# Patient Record
Sex: MALE | Employment: UNEMPLOYED | URBAN - METROPOLITAN AREA
[De-identification: names, ages, dates, MRNs, and addresses within clinical notes are randomized per-mention and may not be internally consistent; named-entity substitution may affect disease eponyms.]

---

## 2021-05-01 ENCOUNTER — APPOINTMENT (OUTPATIENT)
Dept: GENERAL RADIOLOGY | Age: 31
End: 2021-05-01
Attending: PHYSICIAN ASSISTANT

## 2021-05-01 ENCOUNTER — HOSPITAL ENCOUNTER (EMERGENCY)
Age: 31
Discharge: HOME | End: 2021-05-01
Attending: STUDENT IN AN ORGANIZED HEALTH CARE EDUCATION/TRAINING PROGRAM

## 2021-05-01 VITALS
HEIGHT: 72 IN | OXYGEN SATURATION: 89 % | BODY MASS INDEX: 25.73 KG/M2 | WEIGHT: 190 LBS | TEMPERATURE: 98 F | HEART RATE: 96 BPM | DIASTOLIC BLOOD PRESSURE: 95 MMHG | RESPIRATION RATE: 17 BRPM | SYSTOLIC BLOOD PRESSURE: 140 MMHG

## 2021-05-01 DIAGNOSIS — R03.0 ELEVATED BLOOD PRESSURE READING: ICD-10-CM

## 2021-05-01 DIAGNOSIS — M51.36 DDD (DEGENERATIVE DISC DISEASE), LUMBAR: ICD-10-CM

## 2021-05-01 DIAGNOSIS — M54.41 ACUTE RIGHT-SIDED LOW BACK PAIN WITH RIGHT-SIDED SCIATICA: Primary | ICD-10-CM

## 2021-05-01 PROCEDURE — 72100 X-RAY EXAM L-S SPINE 2/3 VWS: CPT

## 2021-05-01 PROCEDURE — 74011250637 HC RX REV CODE- 250/637: Performed by: PHYSICIAN ASSISTANT

## 2021-05-01 PROCEDURE — 96372 THER/PROPH/DIAG INJ SC/IM: CPT

## 2021-05-01 PROCEDURE — 99283 EMERGENCY DEPT VISIT LOW MDM: CPT

## 2021-05-01 PROCEDURE — 74011250636 HC RX REV CODE- 250/636: Performed by: PHYSICIAN ASSISTANT

## 2021-05-01 RX ORDER — ONDANSETRON 4 MG/1
4 TABLET, ORALLY DISINTEGRATING ORAL
Status: COMPLETED | OUTPATIENT
Start: 2021-05-01 | End: 2021-05-01

## 2021-05-01 RX ORDER — IBUPROFEN 800 MG/1
800 TABLET ORAL
Qty: 20 TAB | Refills: 0 | Status: SHIPPED | OUTPATIENT
Start: 2021-05-01 | End: 2021-05-08

## 2021-05-01 RX ORDER — OXYCODONE HYDROCHLORIDE 5 MG/1
10 TABLET ORAL
Status: COMPLETED | OUTPATIENT
Start: 2021-05-01 | End: 2021-05-01

## 2021-05-01 RX ORDER — KETOROLAC TROMETHAMINE 30 MG/ML
60 INJECTION, SOLUTION INTRAMUSCULAR; INTRAVENOUS
Status: COMPLETED | OUTPATIENT
Start: 2021-05-01 | End: 2021-05-01

## 2021-05-01 RX ORDER — OXYCODONE AND ACETAMINOPHEN 5; 325 MG/1; MG/1
1 TABLET ORAL
Qty: 12 TAB | Refills: 0 | Status: SHIPPED | OUTPATIENT
Start: 2021-05-01 | End: 2021-05-04

## 2021-05-01 RX ADMIN — KETOROLAC TROMETHAMINE 60 MG: 30 INJECTION, SOLUTION INTRAMUSCULAR at 14:58

## 2021-05-01 RX ADMIN — OXYCODONE 10 MG: 5 TABLET ORAL at 14:58

## 2021-05-01 RX ADMIN — ONDANSETRON 4 MG: 4 TABLET, ORALLY DISINTEGRATING ORAL at 14:58

## 2021-05-01 NOTE — ED PROVIDER NOTES
EMERGENCY DEPARTMENT HISTORY AND PHYSICAL EXAM      Date: 5/1/2021  Patient Name: Heather Boudreaux    History of Presenting Illness     Chief Complaint   Patient presents with    Back Pain     Ambulatory into the ED with c/o severa pain in Rt lower back/buttock area radiating down RLE - started about 8 days ago, after bending over to pick something up.  Leg Pain       History Provided By: Patient    HPI: Heather Boudreaux, 32 y.o. male with history of anxiety presents ambulatory to the ED with cc of 8 days of 8 out of 10 constant, aching low back pain that radiates slightly down the right buttock and leg. Symptoms are much worse with transition of position and with standing. Patient tells me 8 days ago he went to bend over and pick something up any heard a \"pop\" and had sharp, sudden low back pain. He did not think much of it and over the next few days was able to perform piriformis stretches, however it seemed to make the problem worse. For the past several days pain has worsened and ultimately he went to an urgent care a couple of days ago and was prescribed prednisone, cyclobenzaprine and tramadol. He has not noticed any significant or lasting improvement with the medications. This is a new problem for him and he denies any previous history of significant back pain. He denies any bowel problems such as anal leakage or constipation. There has been no diarrhea. He denies any urinary symptoms such as urinary retention, dysuria, urgency or frequency. He denies any abdominal pain. There has been no chest pain or shortness of breath. He has been well lately without fever. He endorses normal sense of taste and smell. There has been no recent travel and there are no known sick contacts. There are no other complaints, changes, or physical findings at this time.     PCP: None    Current Outpatient Medications   Medication Sig Dispense Refill    ibuprofen (MOTRIN) 800 mg tablet Take 1 Tab by mouth every eight (8) hours as needed for Pain for up to 7 days. 20 Tab 0    oxyCODONE-acetaminophen (Percocet) 5-325 mg per tablet Take 1 Tab by mouth every six (6) hours as needed for Pain for up to 3 days. Max Daily Amount: 4 Tabs. 12 Tab 0    escitalopram (LEXAPRO) 10 mg tablet Take 10 mg by mouth daily. Indications: MAJOR DEPRESSIVE DISORDER       Past History     Past Medical History:  Past Medical History:   Diagnosis Date    Psychiatric disorder     Anxeity       Past Surgical History:  No past surgical history on file. Family History:  No family history on file. Social History:  Social History     Tobacco Use    Smoking status: Never Smoker    Smokeless tobacco: Never Used   Substance Use Topics    Alcohol use: Yes     Alcohol/week: 22.5 standard drinks     Types: 9 Glasses of wine, 9 Cans of beer, 9 Shots of liquor per week    Drug use: Yes     Types: Marijuana       Allergies: Allergies   Allergen Reactions    Pcn [Penicillins] Nausea and Vomiting     Review of Systems   Review of Systems   Constitutional: Negative for fatigue and fever. HENT: Negative for congestion, ear pain and rhinorrhea. Eyes: Negative for pain and redness. Respiratory: Negative for cough and wheezing. Cardiovascular: Negative for chest pain and palpitations. Gastrointestinal: Negative for abdominal pain, nausea and vomiting. Genitourinary: Negative for dysuria, frequency and urgency. Musculoskeletal: Positive for back pain. Negative for neck pain and neck stiffness. Skin: Negative for rash and wound. Neurological: Negative for weakness, light-headedness, numbness and headaches. Psychiatric/Behavioral: The patient is not nervous/anxious. Physical Exam   Physical Exam  Vitals signs and nursing note reviewed. Constitutional:       General: He is not in acute distress. Appearance: He is well-developed. He is not toxic-appearing. HENT:      Head: Normocephalic and atraumatic.  No right periorbital erythema or left periorbital erythema. Jaw: No trismus. Right Ear: External ear normal.      Left Ear: External ear normal.      Nose: Nose normal.      Mouth/Throat:      Pharynx: Uvula midline. Eyes:      General: No scleral icterus. Conjunctiva/sclera: Conjunctivae normal.      Pupils: Pupils are equal, round, and reactive to light. Neck:      Musculoskeletal: Full passive range of motion without pain and normal range of motion. Cardiovascular:      Rate and Rhythm: Normal rate and regular rhythm. Heart sounds: Normal heart sounds. Pulmonary:      Effort: Pulmonary effort is normal. No tachypnea, accessory muscle usage or respiratory distress. Breath sounds: Normal breath sounds. No decreased breath sounds or wheezing. Abdominal:      Palpations: Abdomen is soft. Abdomen is not rigid. Tenderness: There is no abdominal tenderness. There is no guarding. Musculoskeletal: Normal range of motion. Lumbar back: He exhibits tenderness. Back:       Comments:   LOWER BACK:  No bruising, redness or swelling. No step off. Diffuse discomfort that radiates down the right buttock to leg. No CVA tenderness   Skin:     Findings: No rash. Neurological:      Mental Status: He is alert and oriented to person, place, and time. He is not disoriented. GCS: GCS eye subscore is 4. GCS verbal subscore is 5. GCS motor subscore is 6. Cranial Nerves: No cranial nerve deficit. Sensory: No sensory deficit. Comments:   POS seated SLR on the right  Symmetric bulk and tone of both LE  Normal sensation along all LE dermatomes  Ambulates independently with antalgic gait   Psychiatric:         Mood and Affect: Mood is not anxious. Speech: Speech normal.         Thought Content: Thought content does not include suicidal ideation. Diagnostic Study Results     Labs -   No results found for this or any previous visit (from the past 12 hour(s)).     Radiologic Studies -   XR SPINE LUMB 2 OR 3 V   Final Result   Mild degenerative changes at L5-S1. Moderate stool burden in the   colon. CT Results  (Last 48 hours)    None        CXR Results  (Last 48 hours)    None        Medical Decision Making   I am the first provider for this patient. I reviewed the vital signs, available nursing notes, past medical history, past surgical history, family history and social history. Vital Signs-Reviewed the patient's vital signs. Patient Vitals for the past 12 hrs:   Temp Pulse Resp BP SpO2   05/01/21 1600    (!) 140/95 (!) 89 %   05/01/21 1515     96 %   05/01/21 1445     94 %   05/01/21 1401 98 °F (36.7 °C) 96 17 (!) 148/117 97 %       Pulse Oximetry Analysis - 97% on RA    Records Reviewed: Nursing Notes, Old Medical Records and Previous Laboratory Studies    Provider Notes (Medical Decision Making):   DDx: Compression fracture, HNP, DDD, strain    ED Course:   Initial assessment performed. The patients presenting problems have been discussed, and they are in agreement with the care plan formulated and outlined with them. I have encouraged them to ask questions as they arise throughout their visit. Disposition:  Discharge    PLAN:  1. Discharge Medication List as of 5/1/2021  3:32 PM      START taking these medications    Details   ibuprofen (MOTRIN) 800 mg tablet Take 1 Tab by mouth every eight (8) hours as needed for Pain for up to 7 days. , Normal, Disp-20 Tab, R-0      oxyCODONE-acetaminophen (Percocet) 5-325 mg per tablet Take 1 Tab by mouth every six (6) hours as needed for Pain for up to 3 days. Max Daily Amount: 4 Tabs., Normal, Disp-12 Tab, R-0         CONTINUE these medications which have NOT CHANGED    Details   escitalopram (LEXAPRO) 10 mg tablet Take 10 mg by mouth daily. Indications: MAJOR DEPRESSIVE DISORDERHistorical Med, 10 mg           2.    Follow-up Information     Follow up With Specialties Details Why Contact Info Ruddy White MD Orthopedic Surgery Call  [facility] / SP[facility]: call Monday to schedule follow up [address] [place] [address]  [address] [facility]  [phone]          Return to ED if worse     Diagnosis     Clinical Impression:   1. Acute right-sided low back pain with right-sided sciatica    2. DDD (degenerative disc disease), lumbar    3.  Elevated blood pressure reading

## 2023-05-29 RX ORDER — ESCITALOPRAM OXALATE 10 MG/1
10 TABLET ORAL DAILY
COMMUNITY
Start: 2011-05-18

## 2024-04-23 ENCOUNTER — APPOINTMENT (RX ONLY)
Dept: URBAN - METROPOLITAN AREA CLINIC 52 | Facility: CLINIC | Age: 34
Setting detail: DERMATOLOGY
End: 2024-04-23

## 2024-04-23 DIAGNOSIS — L72.8 OTHER FOLLICULAR CYSTS OF THE SKIN AND SUBCUTANEOUS TISSUE: ICD-10-CM | Status: INADEQUATELY CONTROLLED

## 2024-04-23 DIAGNOSIS — L70.0 ACNE VULGARIS: ICD-10-CM

## 2024-04-23 PROCEDURE — ? PRESCRIPTION MEDICATION MANAGEMENT

## 2024-04-23 PROCEDURE — ? PRESCRIPTION

## 2024-04-23 PROCEDURE — ? COUNSELING

## 2024-04-23 PROCEDURE — ? INCISION AND DRAINAGE

## 2024-04-23 PROCEDURE — 10061 I&D ABSCESS COMP/MULTIPLE: CPT

## 2024-04-23 PROCEDURE — 99204 OFFICE O/P NEW MOD 45 MIN: CPT | Mod: 25

## 2024-04-23 PROCEDURE — ? MDM - TREATMENT GOALS

## 2024-04-23 RX ORDER — DOXYCYCLINE HYCLATE 100 MG/1
1 TABLET, COATED ORAL BID
Qty: 20 | Refills: 0 | Status: ERX | COMMUNITY
Start: 2024-04-23

## 2024-04-23 RX ORDER — CLINDAMYCIN PHOSPHATE 10 MG/ML
1 SOLUTION TOPICAL BID
Qty: 60 | Refills: 2 | Status: ERX | COMMUNITY
Start: 2024-04-23

## 2024-04-23 RX ADMIN — DOXYCYCLINE HYCLATE 1: 100 TABLET, COATED ORAL at 00:00

## 2024-04-23 RX ADMIN — CLINDAMYCIN PHOSPHATE 1: 10 SOLUTION TOPICAL at 00:00

## 2024-04-23 ASSESSMENT — LOCATION DETAILED DESCRIPTION DERM
LOCATION DETAILED: RIGHT INFERIOR MEDIAL UPPER BACK
LOCATION DETAILED: RIGHT SUPERIOR MEDIAL MIDBACK
LOCATION DETAILED: INFERIOR THORACIC SPINE

## 2024-04-23 ASSESSMENT — LOCATION SIMPLE DESCRIPTION DERM
LOCATION SIMPLE: RIGHT LOWER BACK
LOCATION SIMPLE: RIGHT UPPER BACK
LOCATION SIMPLE: UPPER BACK

## 2024-04-23 ASSESSMENT — LOCATION ZONE DERM: LOCATION ZONE: TRUNK

## 2024-04-23 NOTE — PROCEDURE: PRESCRIPTION MEDICATION MANAGEMENT
Detail Level: Zone
Render In Strict Bullet Format?: No
Initiate Treatment: clindamycin phosphate 1 % topical swab Bid\\nQuantity: 60.0 Each  Days Supply: 30\\nSig: Apply to clean back every morning and night
Initiate Treatment: doxycycline hyclate 100 mg tablet Bid\\nQuantity: 20.0 Tablet  Days Supply: 10\\nSi tab P.O. bid with food and water for 10 days .Do not take if taking any other antibiotics orally.

## 2024-04-23 NOTE — PROCEDURE: COUNSELING
Dapsone Counseling: I discussed with the patient the risks of dapsone including but not limited to hemolytic anemia, agranulocytosis, rashes, methemoglobinemia, kidney failure, peripheral neuropathy, headaches, GI upset, and liver toxicity.  Patients who start dapsone require monitoring including baseline LFTs and weekly CBCs for the first month, then every month thereafter.  The patient verbalized understanding of the proper use and possible adverse effects of dapsone.  All of the patient's questions and concerns were addressed.
Tetracycline Counseling: Patient counseled regarding possible photosensitivity and increased risk for sunburn.  Patient instructed to avoid sunlight, if possible.  When exposed to sunlight, patients should wear protective clothing, sunglasses, and sunscreen.  The patient was instructed to call the office immediately if the following severe adverse effects occur:  hearing changes, easy bruising/bleeding, severe headache, or vision changes.  The patient verbalized understanding of the proper use and possible adverse effects of tetracycline.  All of the patient's questions and concerns were addressed. Patient understands to avoid pregnancy while on therapy due to potential birth defects.
Topical Retinoid Pregnancy And Lactation Text: This medication is Pregnancy Category C. It is unknown if this medication is excreted in breast milk.
Benzoyl Peroxide Pregnancy And Lactation Text: This medication is Pregnancy Category C. It is unknown if benzoyl peroxide is excreted in breast milk.
Isotretinoin Pregnancy And Lactation Text: This medication is Pregnancy Category X and is considered extremely dangerous during pregnancy. It is unknown if it is excreted in breast milk.
Winlevi Counseling:  I discussed with the patient the risks of topical clascoterone including but not limited to erythema, scaling, itching, and stinging. Patient voiced their understanding.
Birth Control Pills Counseling: Birth Control Pill Counseling: I discussed with the patient the potential side effects of OCPs including but not limited to increased risk of stroke, heart attack, thrombophlebitis, deep venous thrombosis, hepatic adenomas, breast changes, GI upset, headaches, and depression.  The patient verbalized understanding of the proper use and possible adverse effects of OCPs. All of the patient's questions and concerns were addressed.
Erythromycin Pregnancy And Lactation Text: This medication is Pregnancy Category B and is considered safe during pregnancy. It is also excreted in breast milk.
Spironolactone Counseling: Patient advised regarding risks of diarrhea, abdominal pain, hyperkalemia, birth defects (for female patients), liver toxicity and renal toxicity. The patient may need blood work to monitor liver and kidney function and potassium levels while on therapy. The patient verbalized understanding of the proper use and possible adverse effects of spironolactone.  All of the patient's questions and concerns were addressed.
Sarecycline Counseling: Patient advised regarding possible photosensitivity and discoloration of the teeth, skin, lips, tongue and gums.  Patient instructed to avoid sunlight, if possible.  When exposed to sunlight, patients should wear protective clothing, sunglasses, and sunscreen.  The patient was instructed to call the office immediately if the following severe adverse effects occur:  hearing changes, easy bruising/bleeding, severe headache, or vision changes.  The patient verbalized understanding of the proper use and possible adverse effects of sarecycline.  All of the patient's questions and concerns were addressed.
Topical Sulfur Applications Counseling: Topical Sulfur Counseling: Patient counseled that this medication may cause skin irritation or allergic reactions.  In the event of skin irritation, the patient was advised to reduce the amount of the drug applied or use it less frequently.   The patient verbalized understanding of the proper use and possible adverse effects of topical sulfur application.  All of the patient's questions and concerns were addressed.
Include Pregnancy/Lactation Warning?: No
Azelaic Acid Pregnancy And Lactation Text: This medication is considered safe during pregnancy and breast feeding.
Minocycline Counseling: Patient advised regarding possible photosensitivity and discoloration of the teeth, skin, lips, tongue and gums.  Patient instructed to avoid sunlight, if possible.  When exposed to sunlight, patients should wear protective clothing, sunglasses, and sunscreen.  The patient was instructed to call the office immediately if the following severe adverse effects occur:  hearing changes, easy bruising/bleeding, severe headache, or vision changes.  The patient verbalized understanding of the proper use and possible adverse effects of minocycline.  All of the patient's questions and concerns were addressed.
Bactrim Counseling:  I discussed with the patient the risks of sulfa antibiotics including but not limited to GI upset, allergic reaction, drug rash, diarrhea, dizziness, photosensitivity, and yeast infections.  Rarely, more serious reactions can occur including but not limited to aplastic anemia, agranulocytosis, methemoglobinemia, blood dyscrasias, liver or kidney failure, lung infiltrates or desquamative/blistering drug rashes.
Doxycycline Pregnancy And Lactation Text: This medication is Pregnancy Category D and not consider safe during pregnancy. It is also excreted in breast milk but is considered safe for shorter treatment courses.
Aklief Pregnancy And Lactation Text: It is unknown if this medication is safe to use during pregnancy.  It is unknown if this medication is excreted in breast milk.  Breastfeeding women should use the topical cream on the smallest area of the skin for the shortest time needed while breastfeeding.  Do not apply to nipple and areola.
Topical Clindamycin Counseling: Patient counseled that this medication may cause skin irritation or allergic reactions.  In the event of skin irritation, the patient was advised to reduce the amount of the drug applied or use it less frequently.   The patient verbalized understanding of the proper use and possible adverse effects of clindamycin.  All of the patient's questions and concerns were addressed.
Tetracycline Pregnancy And Lactation Text: This medication is Pregnancy Category D and not consider safe during pregnancy. It is also excreted in breast milk.
Azithromycin Counseling:  I discussed with the patient the risks of azithromycin including but not limited to GI upset, allergic reaction, drug rash, diarrhea, and yeast infections.
Tazorac Counseling:  Patient advised that medication is irritating and drying.  Patient may need to apply sparingly and wash off after an hour before eventually leaving it on overnight.  The patient verbalized understanding of the proper use and possible adverse effects of tazorac.  All of the patient's questions and concerns were addressed.
High Dose Vitamin A Counseling: Side effects reviewed, pt to contact office should one occur.
Dapsone Pregnancy And Lactation Text: This medication is Pregnancy Category C and is not considered safe during pregnancy or breast feeding.
Isotretinoin Counseling: Patient should get monthly blood tests, not donate blood, not drive at night if vision affected, not share medication, and not undergo elective surgery for 6 months after tx completed. Side effects reviewed, pt to contact office should one occur.
Birth Control Pills Pregnancy And Lactation Text: This medication should be avoided if pregnant and for the first 30 days post-partum.
Spironolactone Pregnancy And Lactation Text: This medication can cause feminization of the male fetus and should be avoided during pregnancy. The active metabolite is also found in breast milk.
Winlevi Pregnancy And Lactation Text: This medication is considered safe during pregnancy and breastfeeding.
Topical Retinoid counseling:  Patient advised to apply a pea-sized amount only at bedtime and wait 30 minutes after washing their face before applying.  If too drying, patient may add a non-comedogenic moisturizer. The patient verbalized understanding of the proper use and possible adverse effects of retinoids.  All of the patient's questions and concerns were addressed.
Benzoyl Peroxide Counseling: Patient counseled that medicine may cause skin irritation and bleach clothing.  In the event of skin irritation, the patient was advised to reduce the amount of the drug applied or use it less frequently.   The patient verbalized understanding of the proper use and possible adverse effects of benzoyl peroxide.  All of the patient's questions and concerns were addressed.
Topical Sulfur Applications Pregnancy And Lactation Text: This medication is Pregnancy Category C and has an unknown safety profile during pregnancy. It is unknown if this topical medication is excreted in breast milk.
Erythromycin Counseling:  I discussed with the patient the risks of erythromycin including but not limited to GI upset, allergic reaction, drug rash, diarrhea, increase in liver enzymes, and yeast infections.
Bactrim Pregnancy And Lactation Text: This medication is Pregnancy Category D and is known to cause fetal risk.  It is also excreted in breast milk.
Azelaic Acid Counseling: Patient counseled that medicine may cause skin irritation and to avoid applying near the eyes.  In the event of skin irritation, the patient was advised to reduce the amount of the drug applied or use it less frequently.   The patient verbalized understanding of the proper use and possible adverse effects of azelaic acid.  All of the patient's questions and concerns were addressed.
Topical Clindamycin Pregnancy And Lactation Text: This medication is Pregnancy Category B and is considered safe during pregnancy. It is unknown if it is excreted in breast milk.
Azithromycin Pregnancy And Lactation Text: This medication is considered safe during pregnancy and is also secreted in breast milk.
Detail Level: Zone
Doxycycline Counseling:  Patient counseled regarding possible photosensitivity and increased risk for sunburn.  Patient instructed to avoid sunlight, if possible.  When exposed to sunlight, patients should wear protective clothing, sunglasses, and sunscreen.  The patient was instructed to call the office immediately if the following severe adverse effects occur:  hearing changes, easy bruising/bleeding, severe headache, or vision changes.  The patient verbalized understanding of the proper use and possible adverse effects of doxycycline.  All of the patient's questions and concerns were addressed.
High Dose Vitamin A Pregnancy And Lactation Text: High dose vitamin A therapy is contraindicated during pregnancy and breast feeding.
Aklief counseling:  Patient advised to apply a pea-sized amount only at bedtime and wait 30 minutes after washing their face before applying.  If too drying, patient may add a non-comedogenic moisturizer.  The most commonly reported side effects including irritation, redness, scaling, dryness, stinging, burning, itching, and increased risk of sunburn.  The patient verbalized understanding of the proper use and possible adverse effects of retinoids.  All of the patient's questions and concerns were addressed.
Tazorac Pregnancy And Lactation Text: This medication is not safe during pregnancy. It is unknown if this medication is excreted in breast milk.
Detail Level: Detailed

## 2025-03-29 ENCOUNTER — HOSPITAL ENCOUNTER (INPATIENT)
Facility: HOSPITAL | Age: 35
LOS: 2 days | Discharge: HOME | DRG: 897 | End: 2025-03-31
Attending: EMERGENCY MEDICINE | Admitting: STUDENT IN AN ORGANIZED HEALTH CARE EDUCATION/TRAINING PROGRAM
Payer: COMMERCIAL

## 2025-03-29 ENCOUNTER — APPOINTMENT (OUTPATIENT)
Facility: HOSPITAL | Age: 35
DRG: 897 | End: 2025-03-29
Payer: COMMERCIAL

## 2025-03-29 DIAGNOSIS — E87.6 HYPOKALEMIA: ICD-10-CM

## 2025-03-29 DIAGNOSIS — E83.39 HYPOPHOSPHATEMIA: ICD-10-CM

## 2025-03-29 DIAGNOSIS — F10.930 ALCOHOL WITHDRAWAL SYNDROME WITHOUT COMPLICATION (HCC): Primary | ICD-10-CM

## 2025-03-29 DIAGNOSIS — F10.930 ALCOHOL WITHDRAWAL SYNDROME, UNCOMPLICATED (HCC): ICD-10-CM

## 2025-03-29 LAB
ALBUMIN SERPL-MCNC: 4.7 G/DL (ref 3.5–5)
ALBUMIN/GLOB SERPL: 1.2 (ref 1.1–2.2)
ALP SERPL-CCNC: 104 U/L (ref 45–117)
ALT SERPL-CCNC: 84 U/L (ref 12–78)
AMPHET UR QL SCN: NEGATIVE
ANION GAP SERPL CALC-SCNC: 12 MMOL/L (ref 2–12)
APPEARANCE UR: ABNORMAL
AST SERPL-CCNC: 67 U/L (ref 15–37)
BACTERIA URNS QL MICRO: NEGATIVE /HPF
BARBITURATES UR QL SCN: POSITIVE
BASOPHILS # BLD: 0.03 K/UL (ref 0–0.1)
BASOPHILS NFR BLD: 0.3 % (ref 0–1)
BENZODIAZ UR QL: NEGATIVE
BILIRUB SERPL-MCNC: 1.5 MG/DL (ref 0.2–1)
BILIRUB UR QL: NEGATIVE
BUN SERPL-MCNC: 8 MG/DL (ref 6–20)
BUN/CREAT SERPL: 9 (ref 12–20)
CALCIUM SERPL-MCNC: 10.4 MG/DL (ref 8.5–10.1)
CANNABINOIDS UR QL SCN: POSITIVE
CHLORIDE SERPL-SCNC: 101 MMOL/L (ref 97–108)
CO2 SERPL-SCNC: 23 MMOL/L (ref 21–32)
COCAINE UR QL SCN: NEGATIVE
COLOR UR: ABNORMAL
COMMENT:: NORMAL
CREAT SERPL-MCNC: 0.92 MG/DL (ref 0.7–1.3)
DIFFERENTIAL METHOD BLD: ABNORMAL
EOSINOPHIL # BLD: 0.01 K/UL (ref 0–0.4)
EOSINOPHIL NFR BLD: 0.1 % (ref 0–7)
EPITH CASTS URNS QL MICRO: ABNORMAL /LPF
ERYTHROCYTE [DISTWIDTH] IN BLOOD BY AUTOMATED COUNT: 12.2 % (ref 11.5–14.5)
ETHANOL SERPL-MCNC: <10 MG/DL (ref 0–0.08)
GLOBULIN SER CALC-MCNC: 3.9 G/DL (ref 2–4)
GLUCOSE SERPL-MCNC: 148 MG/DL (ref 65–100)
GLUCOSE UR STRIP.AUTO-MCNC: NEGATIVE MG/DL
HCT VFR BLD AUTO: 52.7 % (ref 36.6–50.3)
HGB BLD-MCNC: 19.2 G/DL (ref 12.1–17)
HGB UR QL STRIP: ABNORMAL
HYALINE CASTS URNS QL MICRO: ABNORMAL /LPF (ref 0–5)
IMM GRANULOCYTES # BLD AUTO: 0.04 K/UL (ref 0–0.04)
IMM GRANULOCYTES NFR BLD AUTO: 0.4 % (ref 0–0.5)
INR PPP: 1 (ref 0.9–1.1)
KETONES UR QL STRIP.AUTO: 40 MG/DL
LEUKOCYTE ESTERASE UR QL STRIP.AUTO: ABNORMAL
LYMPHOCYTES # BLD: 1.07 K/UL (ref 0.8–3.5)
LYMPHOCYTES NFR BLD: 11.9 % (ref 12–49)
Lab: ABNORMAL
MAGNESIUM SERPL-MCNC: 2.2 MG/DL (ref 1.6–2.4)
MCH RBC QN AUTO: 30.9 PG (ref 26–34)
MCHC RBC AUTO-ENTMCNC: 36.4 G/DL (ref 30–36.5)
MCV RBC AUTO: 84.9 FL (ref 80–99)
METHADONE UR QL: NEGATIVE
MONOCYTES # BLD: 1.06 K/UL (ref 0–1)
MONOCYTES NFR BLD: 11.8 % (ref 5–13)
NEUTS SEG # BLD: 6.76 K/UL (ref 1.8–8)
NEUTS SEG NFR BLD: 75.5 % (ref 32–75)
NITRITE UR QL STRIP.AUTO: NEGATIVE
NRBC # BLD: 0 K/UL (ref 0–0.01)
NRBC BLD-RTO: 0 PER 100 WBC
OPIATES UR QL: NEGATIVE
PCP UR QL: NEGATIVE
PH UR STRIP: 7.5 (ref 5–8)
PHOSPHATE SERPL-MCNC: 2.4 MG/DL (ref 2.6–4.7)
PLATELET # BLD AUTO: 228 K/UL (ref 150–400)
PMV BLD AUTO: 9.5 FL (ref 8.9–12.9)
POTASSIUM SERPL-SCNC: 2.7 MMOL/L (ref 3.5–5.1)
PROT SERPL-MCNC: 8.6 G/DL (ref 6.4–8.2)
PROT UR STRIP-MCNC: 30 MG/DL
PROTHROMBIN TIME: 10.9 SEC (ref 9.2–11.2)
RBC # BLD AUTO: 6.21 M/UL (ref 4.1–5.7)
RBC #/AREA URNS HPF: ABNORMAL /HPF (ref 0–5)
SODIUM SERPL-SCNC: 136 MMOL/L (ref 136–145)
SP GR UR REFRACTOMETRY: 1.02 (ref 1–1.03)
SPECIMEN HOLD: NORMAL
SPECIMEN HOLD: NORMAL
UROBILINOGEN UR QL STRIP.AUTO: 1 EU/DL (ref 0.2–1)
WBC # BLD AUTO: 9 K/UL (ref 4.1–11.1)
WBC URNS QL MICRO: ABNORMAL /HPF (ref 0–4)

## 2025-03-29 PROCEDURE — 81001 URINALYSIS AUTO W/SCOPE: CPT

## 2025-03-29 PROCEDURE — 96374 THER/PROPH/DIAG INJ IV PUSH: CPT

## 2025-03-29 PROCEDURE — 6370000000 HC RX 637 (ALT 250 FOR IP)

## 2025-03-29 PROCEDURE — 6360000002 HC RX W HCPCS: Performed by: EMERGENCY MEDICINE

## 2025-03-29 PROCEDURE — 6370000000 HC RX 637 (ALT 250 FOR IP): Performed by: STUDENT IN AN ORGANIZED HEALTH CARE EDUCATION/TRAINING PROGRAM

## 2025-03-29 PROCEDURE — 2580000003 HC RX 258: Performed by: STUDENT IN AN ORGANIZED HEALTH CARE EDUCATION/TRAINING PROGRAM

## 2025-03-29 PROCEDURE — 80307 DRUG TEST PRSMV CHEM ANLYZR: CPT

## 2025-03-29 PROCEDURE — 96375 TX/PRO/DX INJ NEW DRUG ADDON: CPT

## 2025-03-29 PROCEDURE — 6360000002 HC RX W HCPCS

## 2025-03-29 PROCEDURE — 83735 ASSAY OF MAGNESIUM: CPT

## 2025-03-29 PROCEDURE — 80053 COMPREHEN METABOLIC PANEL: CPT

## 2025-03-29 PROCEDURE — 82077 ASSAY SPEC XCP UR&BREATH IA: CPT

## 2025-03-29 PROCEDURE — 96361 HYDRATE IV INFUSION ADD-ON: CPT

## 2025-03-29 PROCEDURE — 2580000003 HC RX 258: Performed by: EMERGENCY MEDICINE

## 2025-03-29 PROCEDURE — 2500000003 HC RX 250 WO HCPCS: Performed by: EMERGENCY MEDICINE

## 2025-03-29 PROCEDURE — 6370000000 HC RX 637 (ALT 250 FOR IP): Performed by: EMERGENCY MEDICINE

## 2025-03-29 PROCEDURE — 99285 EMERGENCY DEPT VISIT HI MDM: CPT

## 2025-03-29 PROCEDURE — 76705 ECHO EXAM OF ABDOMEN: CPT

## 2025-03-29 PROCEDURE — 2500000003 HC RX 250 WO HCPCS: Performed by: STUDENT IN AN ORGANIZED HEALTH CARE EDUCATION/TRAINING PROGRAM

## 2025-03-29 PROCEDURE — 85610 PROTHROMBIN TIME: CPT

## 2025-03-29 PROCEDURE — 2060000000 HC ICU INTERMEDIATE R&B

## 2025-03-29 PROCEDURE — 85025 COMPLETE CBC W/AUTO DIFF WBC: CPT

## 2025-03-29 PROCEDURE — 6360000002 HC RX W HCPCS: Performed by: STUDENT IN AN ORGANIZED HEALTH CARE EDUCATION/TRAINING PROGRAM

## 2025-03-29 PROCEDURE — 84100 ASSAY OF PHOSPHORUS: CPT

## 2025-03-29 RX ORDER — SODIUM CHLORIDE 0.9 % (FLUSH) 0.9 %
5-40 SYRINGE (ML) INJECTION EVERY 12 HOURS SCHEDULED
Status: DISCONTINUED | OUTPATIENT
Start: 2025-03-29 | End: 2025-03-31 | Stop reason: HOSPADM

## 2025-03-29 RX ORDER — ACETAMINOPHEN 325 MG/1
650 TABLET ORAL EVERY 6 HOURS PRN
Status: DISCONTINUED | OUTPATIENT
Start: 2025-03-29 | End: 2025-03-31 | Stop reason: HOSPADM

## 2025-03-29 RX ORDER — POLYETHYLENE GLYCOL 3350 17 G/17G
17 POWDER, FOR SOLUTION ORAL DAILY PRN
Status: DISCONTINUED | OUTPATIENT
Start: 2025-03-29 | End: 2025-03-31 | Stop reason: HOSPADM

## 2025-03-29 RX ORDER — LORAZEPAM 2 MG/ML
1 INJECTION INTRAMUSCULAR
Status: DISCONTINUED | OUTPATIENT
Start: 2025-03-29 | End: 2025-03-31 | Stop reason: HOSPADM

## 2025-03-29 RX ORDER — SODIUM CHLORIDE 9 MG/ML
INJECTION, SOLUTION INTRAVENOUS PRN
Status: DISCONTINUED | OUTPATIENT
Start: 2025-03-29 | End: 2025-03-31 | Stop reason: HOSPADM

## 2025-03-29 RX ORDER — LORAZEPAM 1 MG/1
3 TABLET ORAL
Status: DISCONTINUED | OUTPATIENT
Start: 2025-03-29 | End: 2025-03-31 | Stop reason: HOSPADM

## 2025-03-29 RX ORDER — ONDANSETRON 2 MG/ML
INJECTION INTRAMUSCULAR; INTRAVENOUS
Status: COMPLETED
Start: 2025-03-29 | End: 2025-03-29

## 2025-03-29 RX ORDER — ONDANSETRON 4 MG/1
4 TABLET, ORALLY DISINTEGRATING ORAL EVERY 8 HOURS PRN
Status: DISCONTINUED | OUTPATIENT
Start: 2025-03-29 | End: 2025-03-31 | Stop reason: HOSPADM

## 2025-03-29 RX ORDER — POTASSIUM CHLORIDE 7.45 MG/ML
10 INJECTION INTRAVENOUS PRN
Status: DISCONTINUED | OUTPATIENT
Start: 2025-03-29 | End: 2025-03-31 | Stop reason: HOSPADM

## 2025-03-29 RX ORDER — POTASSIUM CHLORIDE 750 MG/1
40 TABLET, EXTENDED RELEASE ORAL PRN
Status: DISCONTINUED | OUTPATIENT
Start: 2025-03-29 | End: 2025-03-31 | Stop reason: HOSPADM

## 2025-03-29 RX ORDER — ONDANSETRON 2 MG/ML
4 INJECTION INTRAMUSCULAR; INTRAVENOUS ONCE
Status: COMPLETED | OUTPATIENT
Start: 2025-03-29 | End: 2025-03-29

## 2025-03-29 RX ORDER — CLONIDINE HYDROCHLORIDE 0.1 MG/1
0.1 TABLET ORAL 2 TIMES DAILY
Status: DISCONTINUED | OUTPATIENT
Start: 2025-03-29 | End: 2025-03-31 | Stop reason: HOSPADM

## 2025-03-29 RX ORDER — MAGNESIUM SULFATE IN WATER 40 MG/ML
2000 INJECTION, SOLUTION INTRAVENOUS PRN
Status: DISCONTINUED | OUTPATIENT
Start: 2025-03-29 | End: 2025-03-31 | Stop reason: HOSPADM

## 2025-03-29 RX ORDER — LORAZEPAM 1 MG/1
1 TABLET ORAL
Status: DISCONTINUED | OUTPATIENT
Start: 2025-03-29 | End: 2025-03-31 | Stop reason: HOSPADM

## 2025-03-29 RX ORDER — ONDANSETRON 2 MG/ML
4 INJECTION INTRAMUSCULAR; INTRAVENOUS EVERY 6 HOURS PRN
Status: DISCONTINUED | OUTPATIENT
Start: 2025-03-29 | End: 2025-03-31 | Stop reason: HOSPADM

## 2025-03-29 RX ORDER — SODIUM CHLORIDE 0.9 % (FLUSH) 0.9 %
5-40 SYRINGE (ML) INJECTION PRN
Status: DISCONTINUED | OUTPATIENT
Start: 2025-03-29 | End: 2025-03-31 | Stop reason: HOSPADM

## 2025-03-29 RX ORDER — CHLORDIAZEPOXIDE HYDROCHLORIDE 25 MG/1
50 CAPSULE, GELATIN COATED ORAL EVERY 8 HOURS
Status: DISCONTINUED | OUTPATIENT
Start: 2025-03-29 | End: 2025-03-30

## 2025-03-29 RX ORDER — SODIUM CHLORIDE, SODIUM LACTATE, POTASSIUM CHLORIDE, AND CALCIUM CHLORIDE .6; .31; .03; .02 G/100ML; G/100ML; G/100ML; G/100ML
1000 INJECTION, SOLUTION INTRAVENOUS ONCE
Status: COMPLETED | OUTPATIENT
Start: 2025-03-29 | End: 2025-03-29

## 2025-03-29 RX ORDER — ACETAMINOPHEN 650 MG/1
650 SUPPOSITORY RECTAL EVERY 6 HOURS PRN
Status: DISCONTINUED | OUTPATIENT
Start: 2025-03-29 | End: 2025-03-31 | Stop reason: HOSPADM

## 2025-03-29 RX ORDER — POTASSIUM CHLORIDE 750 MG/1
40 TABLET, EXTENDED RELEASE ORAL EVERY 4 HOURS
Status: COMPLETED | OUTPATIENT
Start: 2025-03-29 | End: 2025-03-29

## 2025-03-29 RX ORDER — ESCITALOPRAM OXALATE 10 MG/1
10 TABLET ORAL DAILY
Status: DISCONTINUED | OUTPATIENT
Start: 2025-03-29 | End: 2025-03-31 | Stop reason: HOSPADM

## 2025-03-29 RX ORDER — LORAZEPAM 2 MG/ML
4 INJECTION INTRAMUSCULAR
Status: DISCONTINUED | OUTPATIENT
Start: 2025-03-29 | End: 2025-03-31 | Stop reason: HOSPADM

## 2025-03-29 RX ORDER — LORAZEPAM 2 MG/ML
3 INJECTION INTRAMUSCULAR
Status: DISCONTINUED | OUTPATIENT
Start: 2025-03-29 | End: 2025-03-31 | Stop reason: HOSPADM

## 2025-03-29 RX ORDER — LORAZEPAM 1 MG/1
2 TABLET ORAL
Status: DISCONTINUED | OUTPATIENT
Start: 2025-03-29 | End: 2025-03-31 | Stop reason: HOSPADM

## 2025-03-29 RX ORDER — FOLIC ACID 1 MG/1
1 TABLET ORAL ONCE
Status: COMPLETED | OUTPATIENT
Start: 2025-03-29 | End: 2025-03-29

## 2025-03-29 RX ORDER — LORAZEPAM 1 MG/1
4 TABLET ORAL
Status: DISCONTINUED | OUTPATIENT
Start: 2025-03-29 | End: 2025-03-31 | Stop reason: HOSPADM

## 2025-03-29 RX ORDER — ENOXAPARIN SODIUM 100 MG/ML
40 INJECTION SUBCUTANEOUS DAILY
Status: DISCONTINUED | OUTPATIENT
Start: 2025-03-29 | End: 2025-03-31 | Stop reason: HOSPADM

## 2025-03-29 RX ORDER — MULTIVITAMIN WITH IRON
1 TABLET ORAL DAILY
Status: DISCONTINUED | OUTPATIENT
Start: 2025-03-29 | End: 2025-03-31 | Stop reason: HOSPADM

## 2025-03-29 RX ORDER — LORAZEPAM 2 MG/ML
2 INJECTION INTRAMUSCULAR
Status: DISCONTINUED | OUTPATIENT
Start: 2025-03-29 | End: 2025-03-31 | Stop reason: HOSPADM

## 2025-03-29 RX ORDER — PHENOBARBITAL SODIUM 65 MG/ML
130 INJECTION, SOLUTION INTRAMUSCULAR; INTRAVENOUS ONCE
Status: COMPLETED | OUTPATIENT
Start: 2025-03-29 | End: 2025-03-29

## 2025-03-29 RX ORDER — SODIUM CHLORIDE, SODIUM LACTATE, POTASSIUM CHLORIDE, CALCIUM CHLORIDE 600; 310; 30; 20 MG/100ML; MG/100ML; MG/100ML; MG/100ML
INJECTION, SOLUTION INTRAVENOUS CONTINUOUS
Status: DISPENSED | OUTPATIENT
Start: 2025-03-29 | End: 2025-03-30

## 2025-03-29 RX ORDER — LANOLIN ALCOHOL/MO/W.PET/CERES
100 CREAM (GRAM) TOPICAL DAILY
Status: DISCONTINUED | OUTPATIENT
Start: 2025-03-29 | End: 2025-03-31 | Stop reason: HOSPADM

## 2025-03-29 RX ORDER — FOLIC ACID 1 MG/1
1 TABLET ORAL DAILY
Status: DISCONTINUED | OUTPATIENT
Start: 2025-03-29 | End: 2025-03-31 | Stop reason: HOSPADM

## 2025-03-29 RX ORDER — MULTIVITAMIN WITH IRON
1 TABLET ORAL ONCE
Status: COMPLETED | OUTPATIENT
Start: 2025-03-29 | End: 2025-03-29

## 2025-03-29 RX ADMIN — POTASSIUM CHLORIDE 40 MEQ: 750 TABLET, FILM COATED, EXTENDED RELEASE ORAL at 10:10

## 2025-03-29 RX ADMIN — POTASSIUM CHLORIDE 10 MEQ: 7.46 INJECTION, SOLUTION INTRAVENOUS at 04:46

## 2025-03-29 RX ADMIN — THERA TABS 1 TABLET: TAB at 03:07

## 2025-03-29 RX ADMIN — SODIUM CHLORIDE, SODIUM LACTATE, POTASSIUM CHLORIDE, AND CALCIUM CHLORIDE: .6; .31; .03; .02 INJECTION, SOLUTION INTRAVENOUS at 06:06

## 2025-03-29 RX ADMIN — POTASSIUM CHLORIDE 40 MEQ: 750 TABLET, FILM COATED, EXTENDED RELEASE ORAL at 14:30

## 2025-03-29 RX ADMIN — ESCITALOPRAM OXALATE 10 MG: 10 TABLET ORAL at 10:11

## 2025-03-29 RX ADMIN — LORAZEPAM 2 MG: 1 TABLET ORAL at 02:05

## 2025-03-29 RX ADMIN — LORAZEPAM 2 MG: 1 TABLET ORAL at 10:11

## 2025-03-29 RX ADMIN — ENOXAPARIN SODIUM 40 MG: 100 INJECTION SUBCUTANEOUS at 10:13

## 2025-03-29 RX ADMIN — CLONIDINE HYDROCHLORIDE 0.1 MG: 0.1 TABLET ORAL at 21:22

## 2025-03-29 RX ADMIN — SODIUM CHLORIDE, PRESERVATIVE FREE 10 ML: 5 INJECTION INTRAVENOUS at 21:22

## 2025-03-29 RX ADMIN — ONDANSETRON 4 MG: 2 INJECTION, SOLUTION INTRAMUSCULAR; INTRAVENOUS at 03:10

## 2025-03-29 RX ADMIN — LORAZEPAM 2 MG: 2 INJECTION INTRAMUSCULAR; INTRAVENOUS at 03:14

## 2025-03-29 RX ADMIN — CHLORDIAZEPOXIDE HYDROCHLORIDE 50 MG: 25 CAPSULE ORAL at 05:58

## 2025-03-29 RX ADMIN — CLONIDINE HYDROCHLORIDE 0.1 MG: 0.1 TABLET ORAL at 06:07

## 2025-03-29 RX ADMIN — PHENOBARBITAL SODIUM 130 MG: 65 INJECTION INTRAMUSCULAR; INTRAVENOUS at 05:57

## 2025-03-29 RX ADMIN — CHLORDIAZEPOXIDE HYDROCHLORIDE 50 MG: 25 CAPSULE ORAL at 21:22

## 2025-03-29 RX ADMIN — ONDANSETRON 4 MG: 2 INJECTION INTRAMUSCULAR; INTRAVENOUS at 03:10

## 2025-03-29 RX ADMIN — POTASSIUM CHLORIDE 10 MEQ: 7.46 INJECTION, SOLUTION INTRAVENOUS at 05:50

## 2025-03-29 RX ADMIN — SODIUM CHLORIDE, SODIUM LACTATE, POTASSIUM CHLORIDE, AND CALCIUM CHLORIDE 1000 ML: .6; .31; .03; .02 INJECTION, SOLUTION INTRAVENOUS at 03:11

## 2025-03-29 RX ADMIN — POTASSIUM CHLORIDE 40 MEQ: 750 TABLET, FILM COATED, EXTENDED RELEASE ORAL at 05:58

## 2025-03-29 RX ADMIN — SODIUM PHOSPHATE, MONOBASIC, MONOHYDRATE AND SODIUM PHOSPHATE, DIBASIC, ANHYDROUS 15 MMOL: 276; 142 INJECTION, SOLUTION INTRAVENOUS at 06:00

## 2025-03-29 RX ADMIN — SODIUM CHLORIDE, SODIUM LACTATE, POTASSIUM CHLORIDE, AND CALCIUM CHLORIDE: .6; .31; .03; .02 INJECTION, SOLUTION INTRAVENOUS at 14:56

## 2025-03-29 RX ADMIN — Medication 1 MG: at 03:07

## 2025-03-29 RX ADMIN — Medication 100 MG: at 10:11

## 2025-03-29 RX ADMIN — CHLORDIAZEPOXIDE HYDROCHLORIDE 50 MG: 25 CAPSULE ORAL at 14:30

## 2025-03-29 RX ADMIN — LORAZEPAM 1 MG: 2 INJECTION INTRAMUSCULAR; INTRAVENOUS at 23:22

## 2025-03-29 RX ADMIN — SODIUM CHLORIDE, SODIUM LACTATE, POTASSIUM CHLORIDE, AND CALCIUM CHLORIDE: .6; .31; .03; .02 INJECTION, SOLUTION INTRAVENOUS at 23:24

## 2025-03-29 ASSESSMENT — PAIN SCALES - GENERAL: PAINLEVEL_OUTOF10: 0

## 2025-03-29 ASSESSMENT — PAIN - FUNCTIONAL ASSESSMENT: PAIN_FUNCTIONAL_ASSESSMENT: NONE - DENIES PAIN

## 2025-03-29 NOTE — ED PROVIDER NOTES
[facility] EMERGENCY DEPARTMENT  EMERGENCY DEPARTMENT ENCOUNTER      Pt Name: Hilario Burkett  MRN: [id number]  Birthdate 1990  Date of evaluation: 3/29/2025  Provider: Samuel Castro MD    CHIEF COMPLAINT       Chief Complaint   Patient presents with    Alcohol Problem       ALLERGIES     Penicillins    ENCOUNTER     HISTORY OF PRESENT ILLNESS:    A 34-year-old male with a past medical history of anxiety was accompanied to the Emergency Department by his mother and father. The history was provided by both the patient and his mother. He presents with symptoms of alcohol withdrawal, having had his last drink on Thursday at midnight. Withdrawal symptoms, including vomiting and difficulty tolerating oral intake, began this morning at 6:00 AM. The patient attempted self-treatment with light beer, but his symptoms persisted. He denies any prior history of seizures.    PAST MEDICAL HISTORY:   - History of anxiety    SOCIAL HISTORY:   - Denies tobacco use    PHYSICAL EXAM:    General: Appears in mild emotional distress. Well-kept male adult.    Eyes: Appear normal with no scleral icterus.    HENT: Atraumatic. Moist mucous membranes, no pharyngeal erythema, edema or lesions.    Neck: Atraumatic, supple.    Cardiac: Tachycardic with a pulse of 126 bpm, regular rhythm, no significant murmurs appreciated.    Respiratory: No respiratory distress, clear lungs bilaterally with no abnormal breath sounds.    Abdomen: Soft. Nontender. Nondistended. No rebound. No guarding. No tenderness over McBurney's point, no tenderness over the liver or spleen, no Goff's sign, no pulsatile abdominal mass. No CVAT.    MS: Extremities atraumatic. Mild tremor noted. No edema, no calf tenderness to palpation.    Skin: No exanthems, no cyanosis, no diaphoresis. No piloerection.  Back exam: Atraumatic.    Neurologic: No altered mental status, speech is fluent. Gait is within normal limits. Mildly Tremulous. No cerebellar deficits. No motor

## 2025-03-29 NOTE — ED NOTES
ED TO INPATIENT SBAR HANDOFF    Patient Name: Hilario Burkett   :  1990  34 y.o.   MRN:  [id number]  ED Room #:  ER18/18     Situation  Code Status: Full Code   Allergies: Penicillins  Weight: Patient Vitals for the past 96 hrs (Last 3 readings):   Weight   25 0438 82.1 kg (181 lb)       Arrived from: home    Chief Complaint:   Chief Complaint   Patient presents with    Alcohol Problem       Hospital Problem/Diagnosis:  Principal Problem:    Alcohol withdrawal syndrome, uncomplicated (HCC)  Resolved Problems:    * No resolved hospital problems. *      Mobility: Patient able to ambulate with minimal assistance.  ED Fall Risk: Presents to emergency department  because of falls (Syncope, seizure, or loss of consciousness): No, Age > 70: No, Altered Mental Status, Intoxication with alcohol or substance confusion (Disorientation, impaired judgment, poor safety awaremess, or inability to follow instructions): No, Impaired Mobility: Ambulates or transfers with assistive devices or assistance; Unable to ambulate or transer.: No, Nursing Judgement: No   Fell in ED or prior to admission: no   Restraints: no     Sitter: no   Family/Caregiver Present: no    Neet to know social/safety information: Seizure pads in place due to ETOH withdrawal protocol.    Background  History:   Past Medical History:   Diagnosis Date    Psychiatric disorder     Anxeity       Assessment    Abnormal Assessment Findings: Potassium 2.7, Urine drug screen positive barbiturates and THC, UA abnormal, see ultrasound results.  Imaging:   US GALLBLADDER RUQ   Final Result   Diffusely echogenic liver with a nodular contour suggestive of   steatosis and cirrhosis.   Normal gallbladder.      Electronically signed by AUSTYN HARTMAN        Abnormal labs:   Abnormal Labs Reviewed   CBC WITH AUTO DIFFERENTIAL - Abnormal; Notable for the following components:       Result Value    RBC 6.21 (*)     Hemoglobin 19.2 (*)     Hematocrit 52.7 (*)

## 2025-03-29 NOTE — H&P
History & Physical    Primary Care Provider: No primary care provider on file.  Source of Information: Patient and chart review    History of Presenting Illness:   Hilario Burkett is a 34 y.o. male with history of generalized anxiety disorder, alcohol abuse who presented to ED with concerns for alcohol withdrawal.  Patient admits to drinking beer and wine daily.  He thinks he has been in event of his work but has attempted to quit drinking.  He cut back to drinking 1 night.  Every day to wean himself off of alcohol.  He has had increasing agitation, tremulousness, malaise and fatigue while attempting to do so.  Other symptoms have included nausea with nonbloody, nonbilious emesis and inability to keep down food.    The patient denies any fever, chills, chest or abdominal pain, cough, congestion, recent illness, palpitations, or dysuria.  Remarkable vitals on ER Presentation: Heart rate 142, BP 180s/130s  Labs Remarkable for: K2.7, Hgb 19.2, T. bili 1.5, ALT 84, AST 67  ER Images: None  ER Rx: Ativan, 1 L normal saline bolus     Review of Systems:  Pertinent items are noted in the History of Present Illness.     Past Medical History:   Diagnosis Date    Psychiatric disorder     Anxeity      No past surgical history on file.  Prior to Admission medications    Medication Sig Start Date End Date Taking? Authorizing Provider   escitalopram (LEXAPRO) 10 MG tablet Take 1 tablet by mouth daily 5/18/11   Automatic Reconciliation, Ar     Allergies   Allergen Reactions    Penicillins Nausea And Vomiting      No family history on file.     SOCIAL HISTORY:  Patient resides:  Independently x   Assisted Living    SNF    With family care       Smoking history:   None x   Former    Chronic      Alcohol history:   None x   Social    Chronic      Ambulates:   Independently x   w/cane    w/walker    w/wc    CODE STATUS:  DNR    Full x   Other      Objective:     Physical Exam:     BP (!) 161/131   Pulse 99   Temp 97.7 °F (36.5 °C)

## 2025-03-29 NOTE — ED TRIAGE NOTES
He arrives with his mother. He is experiencing withdrawal. He had his last drink Thursday at midnight. Then at 6am he started to have withdrawal symptoms so he tried to tapar himself by drinking light beer. He has had vomiting and difficulty keeping the beer or electrolytes drinks down. He is trying to stop drinking and has contacted a service to help with his stopping drinking. He is here because he is afraid he is withdrawing. He has not had seizures to his knowledge.

## 2025-03-29 NOTE — ED NOTES
Bedside and Verbal shift change report given to Suraj RN (oncoming nurse) by LIO Larson (offgoing nurse). Report included the following information ED SBAR, MAR, and Recent Results.

## 2025-03-29 NOTE — ED NOTES
Bedside and Verbal shift change report given to LIO Larson (oncoming nurse) by LIO Carey (offgoing nurse). Report included the following information ED SBAR, MAR, and Recent Results.

## 2025-03-30 LAB
ANION GAP SERPL CALC-SCNC: 3 MMOL/L (ref 2–12)
BASOPHILS # BLD: 0.03 K/UL (ref 0–0.1)
BASOPHILS NFR BLD: 0.5 % (ref 0–1)
BUN SERPL-MCNC: 8 MG/DL (ref 6–20)
BUN/CREAT SERPL: 9 (ref 12–20)
CALCIUM SERPL-MCNC: 8.8 MG/DL (ref 8.5–10.1)
CHLORIDE SERPL-SCNC: 108 MMOL/L (ref 97–108)
CO2 SERPL-SCNC: 29 MMOL/L (ref 21–32)
CREAT SERPL-MCNC: 0.85 MG/DL (ref 0.7–1.3)
DIFFERENTIAL METHOD BLD: ABNORMAL
EOSINOPHIL # BLD: 0.17 K/UL (ref 0–0.4)
EOSINOPHIL NFR BLD: 2.8 % (ref 0–7)
ERYTHROCYTE [DISTWIDTH] IN BLOOD BY AUTOMATED COUNT: 12.1 % (ref 11.5–14.5)
GLUCOSE SERPL-MCNC: 91 MG/DL (ref 65–100)
HCT VFR BLD AUTO: 46.7 % (ref 36.6–50.3)
HGB BLD-MCNC: 15.8 G/DL (ref 12.1–17)
IMM GRANULOCYTES # BLD AUTO: 0.03 K/UL (ref 0–0.04)
IMM GRANULOCYTES NFR BLD AUTO: 0.5 % (ref 0–0.5)
LYMPHOCYTES # BLD: 1.89 K/UL (ref 0.8–3.5)
LYMPHOCYTES NFR BLD: 31.4 % (ref 12–49)
MAGNESIUM SERPL-MCNC: 2.1 MG/DL (ref 1.6–2.4)
MCH RBC QN AUTO: 30.9 PG (ref 26–34)
MCHC RBC AUTO-ENTMCNC: 33.8 G/DL (ref 30–36.5)
MCV RBC AUTO: 91.2 FL (ref 80–99)
MONOCYTES # BLD: 0.61 K/UL (ref 0–1)
MONOCYTES NFR BLD: 10.1 % (ref 5–13)
NEUTS SEG # BLD: 3.28 K/UL (ref 1.8–8)
NEUTS SEG NFR BLD: 54.7 % (ref 32–75)
NRBC # BLD: 0 K/UL (ref 0–0.01)
NRBC BLD-RTO: 0 PER 100 WBC
PLATELET # BLD AUTO: 144 K/UL (ref 150–400)
PMV BLD AUTO: 9.9 FL (ref 8.9–12.9)
POTASSIUM SERPL-SCNC: 3.8 MMOL/L (ref 3.5–5.1)
RBC # BLD AUTO: 5.12 M/UL (ref 4.1–5.7)
SODIUM SERPL-SCNC: 140 MMOL/L (ref 136–145)
WBC # BLD AUTO: 6 K/UL (ref 4.1–11.1)

## 2025-03-30 PROCEDURE — 85025 COMPLETE CBC W/AUTO DIFF WBC: CPT

## 2025-03-30 PROCEDURE — 2500000003 HC RX 250 WO HCPCS: Performed by: STUDENT IN AN ORGANIZED HEALTH CARE EDUCATION/TRAINING PROGRAM

## 2025-03-30 PROCEDURE — 80048 BASIC METABOLIC PNL TOTAL CA: CPT

## 2025-03-30 PROCEDURE — 6370000000 HC RX 637 (ALT 250 FOR IP)

## 2025-03-30 PROCEDURE — 83735 ASSAY OF MAGNESIUM: CPT

## 2025-03-30 PROCEDURE — 2500000003 HC RX 250 WO HCPCS

## 2025-03-30 PROCEDURE — 6360000002 HC RX W HCPCS

## 2025-03-30 PROCEDURE — 6370000000 HC RX 637 (ALT 250 FOR IP): Performed by: STUDENT IN AN ORGANIZED HEALTH CARE EDUCATION/TRAINING PROGRAM

## 2025-03-30 PROCEDURE — 2060000000 HC ICU INTERMEDIATE R&B

## 2025-03-30 PROCEDURE — 6370000000 HC RX 637 (ALT 250 FOR IP): Performed by: FAMILY MEDICINE

## 2025-03-30 PROCEDURE — 6360000002 HC RX W HCPCS: Performed by: STUDENT IN AN ORGANIZED HEALTH CARE EDUCATION/TRAINING PROGRAM

## 2025-03-30 RX ORDER — CHLORDIAZEPOXIDE HYDROCHLORIDE 25 MG/1
25 CAPSULE, GELATIN COATED ORAL EVERY 8 HOURS
Status: DISCONTINUED | OUTPATIENT
Start: 2025-03-30 | End: 2025-03-31 | Stop reason: HOSPADM

## 2025-03-30 RX ADMIN — SODIUM CHLORIDE, PRESERVATIVE FREE 10 ML: 5 INJECTION INTRAVENOUS at 21:09

## 2025-03-30 RX ADMIN — CHLORDIAZEPOXIDE HYDROCHLORIDE 25 MG: 25 CAPSULE ORAL at 14:04

## 2025-03-30 RX ADMIN — CHLORDIAZEPOXIDE HYDROCHLORIDE 50 MG: 25 CAPSULE ORAL at 06:41

## 2025-03-30 RX ADMIN — ESCITALOPRAM OXALATE 10 MG: 10 TABLET ORAL at 08:22

## 2025-03-30 RX ADMIN — SODIUM CHLORIDE, PRESERVATIVE FREE 10 ML: 5 INJECTION INTRAVENOUS at 08:23

## 2025-03-30 RX ADMIN — CLONIDINE HYDROCHLORIDE 0.1 MG: 0.1 TABLET ORAL at 21:07

## 2025-03-30 RX ADMIN — Medication 1 MG: at 08:22

## 2025-03-30 RX ADMIN — CLONIDINE HYDROCHLORIDE 0.1 MG: 0.1 TABLET ORAL at 08:22

## 2025-03-30 RX ADMIN — MULTIVITAMIN TABLET 1 TABLET: TABLET at 08:22

## 2025-03-30 RX ADMIN — Medication 100 MG: at 08:22

## 2025-03-30 RX ADMIN — SODIUM CHLORIDE, PRESERVATIVE FREE 10 ML: 5 INJECTION INTRAVENOUS at 08:24

## 2025-03-30 RX ADMIN — LORAZEPAM 2 MG: 2 INJECTION INTRAMUSCULAR; INTRAVENOUS at 12:27

## 2025-03-30 RX ADMIN — CHLORDIAZEPOXIDE HYDROCHLORIDE 25 MG: 25 CAPSULE ORAL at 21:07

## 2025-03-30 RX ADMIN — ENOXAPARIN SODIUM 40 MG: 100 INJECTION SUBCUTANEOUS at 08:23

## 2025-03-30 ASSESSMENT — PAIN SCALES - GENERAL
PAINLEVEL_OUTOF10: 0
PAINLEVEL_OUTOF10: 0

## 2025-03-30 NOTE — PROGRESS NOTES
mg  3 mg IntraVENous Q1H PRN    Or    LORazepam (ATIVAN) tablet 4 mg  4 mg Oral Q1H PRN    Or    LORazepam (ATIVAN) injection 4 mg  4 mg IntraVENous Q1H PRN    potassium chloride (KLOR-CON) extended release tablet 40 mEq  40 mEq Oral PRN    Or    potassium bicarb-citric acid (EFFER-K) effervescent tablet 40 mEq  40 mEq Oral PRN    Or    potassium chloride 10 mEq/100 mL IVPB (Peripheral Line)  10 mEq IntraVENous PRN    sodium phosphate 15 mmol in sodium chloride 0.9 % 250 mL IVPB  15 mmol IntraVENous PRN    chlordiazePOXIDE (LIBRIUM) capsule 50 mg  50 mg Oral q8h    multivitamin 1 tablet  1 tablet Oral Daily    folic acid (FOLVITE) tablet 1 mg  1 mg Oral Daily    sodium chloride flush 0.9 % injection 5-40 mL  5-40 mL IntraVENous 2 times per day    sodium chloride flush 0.9 % injection 5-40 mL  5-40 mL IntraVENous PRN    0.9 % sodium chloride infusion   IntraVENous PRN    potassium chloride (KLOR-CON) extended release tablet 40 mEq  40 mEq Oral PRN    Or    potassium bicarb-citric acid (EFFER-K) effervescent tablet 40 mEq  40 mEq Oral PRN    Or    potassium chloride 10 mEq/100 mL IVPB (Peripheral Line)  10 mEq IntraVENous PRN    magnesium sulfate 2000 mg in 50 mL IVPB premix  2,000 mg IntraVENous PRN    enoxaparin (LOVENOX) injection 40 mg  40 mg SubCUTAneous Daily    ondansetron (ZOFRAN-ODT) disintegrating tablet 4 mg  4 mg Oral Q8H PRN    Or    ondansetron (ZOFRAN) injection 4 mg  4 mg IntraVENous Q6H PRN    polyethylene glycol (GLYCOLAX) packet 17 g  17 g Oral Daily PRN    acetaminophen (TYLENOL) tablet 650 mg  650 mg Oral Q6H PRN    Or    acetaminophen (TYLENOL) suppository 650 mg  650 mg Rectal Q6H PRN    lactated ringers infusion   IntraVENous Continuous    potassium chloride (KLOR-CON) extended release tablet 40 mEq  40 mEq Oral Q4H    cloNIDine (CATAPRES) tablet 0.1 mg  0.1 mg Oral BID    escitalopram (LEXAPRO) tablet 10 mg  10 mg Oral Daily     Current Outpatient Medications   Medication Sig    escitalopram 
tablet 0.1 mg  0.1 mg Oral BID    escitalopram (LEXAPRO) tablet 10 mg  10 mg Oral Daily     ______________________________________________________________________  EXPECTED LENGTH OF STAY: 3  ACTUAL LENGTH OF STAY:          1                 Elkin Comer MD

## 2025-03-31 VITALS
SYSTOLIC BLOOD PRESSURE: 125 MMHG | RESPIRATION RATE: 21 BRPM | BODY MASS INDEX: 24.24 KG/M2 | DIASTOLIC BLOOD PRESSURE: 92 MMHG | TEMPERATURE: 97.6 F | HEART RATE: 101 BPM | HEIGHT: 72 IN | WEIGHT: 179 LBS | OXYGEN SATURATION: 94 %

## 2025-03-31 LAB
ANION GAP SERPL CALC-SCNC: 6 MMOL/L (ref 2–12)
BUN SERPL-MCNC: 8 MG/DL (ref 6–20)
BUN/CREAT SERPL: 10 (ref 12–20)
CALCIUM SERPL-MCNC: 9 MG/DL (ref 8.5–10.1)
CHLORIDE SERPL-SCNC: 105 MMOL/L (ref 97–108)
CO2 SERPL-SCNC: 26 MMOL/L (ref 21–32)
COMMENT:: NORMAL
CREAT SERPL-MCNC: 0.77 MG/DL (ref 0.7–1.3)
GLUCOSE SERPL-MCNC: 83 MG/DL (ref 65–100)
POTASSIUM SERPL-SCNC: 3.2 MMOL/L (ref 3.5–5.1)
SODIUM SERPL-SCNC: 137 MMOL/L (ref 136–145)
SPECIMEN HOLD: NORMAL

## 2025-03-31 PROCEDURE — 6370000000 HC RX 637 (ALT 250 FOR IP)

## 2025-03-31 PROCEDURE — 6360000002 HC RX W HCPCS: Performed by: STUDENT IN AN ORGANIZED HEALTH CARE EDUCATION/TRAINING PROGRAM

## 2025-03-31 PROCEDURE — 2500000003 HC RX 250 WO HCPCS: Performed by: STUDENT IN AN ORGANIZED HEALTH CARE EDUCATION/TRAINING PROGRAM

## 2025-03-31 PROCEDURE — 80048 BASIC METABOLIC PNL TOTAL CA: CPT

## 2025-03-31 PROCEDURE — 6370000000 HC RX 637 (ALT 250 FOR IP): Performed by: FAMILY MEDICINE

## 2025-03-31 PROCEDURE — 2500000003 HC RX 250 WO HCPCS

## 2025-03-31 PROCEDURE — 6370000000 HC RX 637 (ALT 250 FOR IP): Performed by: STUDENT IN AN ORGANIZED HEALTH CARE EDUCATION/TRAINING PROGRAM

## 2025-03-31 PROCEDURE — 6360000002 HC RX W HCPCS

## 2025-03-31 RX ORDER — LORAZEPAM 1 MG/1
1 TABLET ORAL EVERY 8 HOURS PRN
Qty: 10 TABLET | Refills: 0 | Status: SHIPPED | OUTPATIENT
Start: 2025-03-31 | End: 2025-04-30

## 2025-03-31 RX ORDER — POTASSIUM CHLORIDE 750 MG/1
40 TABLET, EXTENDED RELEASE ORAL ONCE
Status: COMPLETED | OUTPATIENT
Start: 2025-03-31 | End: 2025-03-31

## 2025-03-31 RX ORDER — CHLORDIAZEPOXIDE HYDROCHLORIDE 25 MG/1
CAPSULE, GELATIN COATED ORAL
Qty: 7 CAPSULE | Refills: 0 | Status: SHIPPED | OUTPATIENT
Start: 2025-03-31 | End: 2025-04-04

## 2025-03-31 RX ADMIN — SODIUM CHLORIDE, PRESERVATIVE FREE 10 ML: 5 INJECTION INTRAVENOUS at 08:51

## 2025-03-31 RX ADMIN — LORAZEPAM 2 MG: 2 INJECTION INTRAMUSCULAR; INTRAVENOUS at 00:01

## 2025-03-31 RX ADMIN — CHLORDIAZEPOXIDE HYDROCHLORIDE 25 MG: 25 CAPSULE ORAL at 06:20

## 2025-03-31 RX ADMIN — ESCITALOPRAM OXALATE 10 MG: 10 TABLET ORAL at 08:51

## 2025-03-31 RX ADMIN — Medication 100 MG: at 08:51

## 2025-03-31 RX ADMIN — MULTIVITAMIN TABLET 1 TABLET: TABLET at 08:51

## 2025-03-31 RX ADMIN — ENOXAPARIN SODIUM 40 MG: 100 INJECTION SUBCUTANEOUS at 08:50

## 2025-03-31 RX ADMIN — Medication 1 MG: at 08:51

## 2025-03-31 RX ADMIN — POTASSIUM CHLORIDE 40 MEQ: 750 TABLET, FILM COATED, EXTENDED RELEASE ORAL at 08:50

## 2025-03-31 RX ADMIN — CLONIDINE HYDROCHLORIDE 0.1 MG: 0.1 TABLET ORAL at 08:51

## 2025-03-31 ASSESSMENT — PAIN SCALES - GENERAL: PAINLEVEL_OUTOF10: 0

## 2025-03-31 NOTE — DISCHARGE SUMMARY
Discharge Summary       PATIENT ID: Hilario Burkett  MRN: [id number]   YOB: 1990    DATE OF ADMISSION: 3/29/2025  1:50 AM    DATE OF DISCHARGE: 3/31/2025   PRIMARY CARE PROVIDER: No primary care provider on file.     ATTENDING PHYSICIAN: Elkin Comer  DISCHARGING PROVIDER: Elkin Comer MD      CONSULTATIONS: IP CONSULT TO SOCIAL WORK    PROCEDURES/SURGERIES: * No surgery found *    ADMISSION SUMMARY AND HOSPITAL COURSE:     Hilario Burkett is a 34 y.o. male with history of generalized anxiety disorder, alcohol abuse who presented to ED with concerns for alcohol withdrawal. Patient admits to drinking beer and wine daily.  He thinks he has been in event of his work but has attempted to quit drinking.  He cut back to drinking 1 night.  Every day to wean himself off of alcohol.  He has had increasing agitation, tremulousness, malaise and fatigue while attempting to do so.  Other symptoms have included nausea with nonbloody, nonbilious emesis and inability to keep down food.    Patient was admitted for alcohol withdrawal.  Started on Librium on admission and monitor on CIWA protocol.  Overall patient's symptoms improved and Librium dose was tapered.  Patient to go home with tapering dose of Librium.  Patient also with alcoholic liver disease and noted abnormal LFTs on presentation.  Right upper quadrant ultrasound shows echogenic liver with nodular contour suggestive steatosis or cirrhosis.  Patient has resources to follow-up with alcohol rehab program as outpatient.    DISCHARGE DIAGNOSES / PLAN:      BMI: Body mass index is 24.28 kg/m².. This patient: Has a BMI within normal limits.    PENDING TEST RESULTS:   At the time of discharge the following test results are still pending: None     ADDITIONAL CARE RECOMMENDATIONS:     Follow-up with PCP in 1 to 2 weeks.     DIET: cardiac diet    ACTIVITY: activity as tolerated    EQUIPMENT needed: None    NOTIFY YOUR PHYSICIAN FOR ANY OF THE FOLLOWING:   Fever over 101

## 2025-03-31 NOTE — PLAN OF CARE
Problem: Discharge Planning  Goal: Discharge to home or other facility with appropriate resources  3/31/2025 1003 by Kiara Scott RN  Outcome: Adequate for Discharge  3/31/2025 1003 by Kiara Scott RN  Outcome: Progressing     Problem: Pain  Goal: Verbalizes/displays adequate comfort level or baseline comfort level  3/31/2025 1003 by Kiara Scott RN  Outcome: Adequate for Discharge  3/31/2025 1003 by Kiara Scott RN  Outcome: Progressing  3/31/2025 0423 by Gabbie Wilcox RN  Outcome: Progressing     Problem: Safety - Adult  Goal: Free from fall injury  3/31/2025 1003 by Kiara Scott RN  Outcome: Adequate for Discharge  3/31/2025 1003 by Kiara Scott RN  Outcome: Progressing  3/31/2025 0423 by Gabbie Wilcox RN  Outcome: Progressing

## 2025-03-31 NOTE — PLAN OF CARE
Problem: Discharge Planning  Goal: Discharge to home or other facility with appropriate resources  Outcome: Progressing     Problem: Pain  Goal: Verbalizes/displays adequate comfort level or baseline comfort level  3/31/2025 1003 by Kiara Scott RN  Outcome: Progressing  3/31/2025 0423 by Gabbie Wilcox RN  Outcome: Progressing     Problem: Safety - Adult  Goal: Free from fall injury  3/31/2025 1003 by Kiara Scott RN  Outcome: Progressing  3/31/2025 0423 by Gabbie Wilcox RN  Outcome: Progressing

## 2025-04-08 NOTE — CARE COORDINATION
Care Management Initial Assessment       RUR:  5% Low Risk  Readmission? No  1st IM letter given? No  1st  letter given: No       03/31/25 1123   Service Assessment   Patient Orientation Alert and Oriented   Cognition Alert   History Provided By Patient   Primary Caregiver Self   Support Systems Friends/Neighbors;Family Members   Patient's Healthcare Decision Maker is: Legal Next of Kin   PCP Verified by CM No   Prior Functional Level Independent in ADLs/IADLs   Current Functional Level Independent in ADLs/IADLs   Can patient return to prior living arrangement Yes   Ability to make needs known: Good   Family able to assist with home care needs: Yes   Would you like for me to discuss the discharge plan with any other family members/significant others, and if so, who? No   Social/Functional History   Lives With Alone   Type of Home Apartment   Home Layout One level   Home Access Stairs to enter with rails   Entrance Stairs - Rails Both   Bathroom Toilet Standard   Home Equipment None   Receives Help From Family   Prior Level of Assist for ADLs Independent   Prior Level of Assist for Homemaking Independent   Homemaking Responsibilities Yes   Meal Prep Responsibility Primary   Laundry Responsibility Primary   Cleaning Responsibility Primary   Bill Paying/Finance Responsibility Primary   Shopping Responsibility Primary   Health Care Management Primary   Ambulation Assistance Independent   Prior Level of Assist for Transfers Independent   Active  Yes   Education Bachelor's degree   Occupation Full time employment   Discharge Planning   Type of Residence Apartment   Living Arrangements Alone   Current Services Prior To Admission None   Potential Assistance Needed N/A   DME Ordered? No   Potential Assistance Purchasing Medications No   Type of Home Care Services None   Patient expects to be discharged to: Apartment   History of falls? 1   Services At/After Discharge   Transition of Care Consult (CM Consult) N/A 
Detail Level: Zone
Render In Strict Bullet Format?: No
Continue Regimen: Resume Efudex 5 % topical cream \\nApply twice daily arms for 2 weeks then take 2 week break, then repeat

## 2025-04-17 ENCOUNTER — HOSPITAL ENCOUNTER (EMERGENCY)
Facility: HOSPITAL | Age: 35
Discharge: HOME | DRG: 885 | End: 2025-04-17
Attending: STUDENT IN AN ORGANIZED HEALTH CARE EDUCATION/TRAINING PROGRAM
Payer: COMMERCIAL

## 2025-04-17 VITALS
DIASTOLIC BLOOD PRESSURE: 104 MMHG | HEART RATE: 100 BPM | HEIGHT: 72 IN | WEIGHT: 176.37 LBS | OXYGEN SATURATION: 96 % | TEMPERATURE: 98.6 F | BODY MASS INDEX: 23.89 KG/M2 | SYSTOLIC BLOOD PRESSURE: 156 MMHG | RESPIRATION RATE: 18 BRPM

## 2025-04-17 DIAGNOSIS — F10.930 ALCOHOL WITHDRAWAL SYNDROME, UNCOMPLICATED (HCC): ICD-10-CM

## 2025-04-17 DIAGNOSIS — F10.230 ALCOHOL DEPENDENCE WITH UNCOMPLICATED WITHDRAWAL (HCC): Primary | ICD-10-CM

## 2025-04-17 LAB
ALBUMIN SERPL-MCNC: 4.5 G/DL (ref 3.5–5)
ALBUMIN/GLOB SERPL: 1.3 (ref 1.1–2.2)
ALP SERPL-CCNC: 118 U/L (ref 45–117)
ALT SERPL-CCNC: 135 U/L (ref 12–78)
ANION GAP SERPL CALC-SCNC: 7 MMOL/L (ref 2–12)
AST SERPL-CCNC: 106 U/L (ref 15–37)
BASOPHILS # BLD: 0.03 K/UL (ref 0–0.1)
BASOPHILS NFR BLD: 0.3 % (ref 0–1)
BILIRUB SERPL-MCNC: 0.9 MG/DL (ref 0.2–1)
BUN SERPL-MCNC: 3 MG/DL (ref 6–20)
BUN/CREAT SERPL: 3 (ref 12–20)
CALCIUM SERPL-MCNC: 10 MG/DL (ref 8.5–10.1)
CHLORIDE SERPL-SCNC: 102 MMOL/L (ref 97–108)
CO2 SERPL-SCNC: 28 MMOL/L (ref 21–32)
COMMENT:: NORMAL
CREAT SERPL-MCNC: 1.06 MG/DL (ref 0.7–1.3)
DIFFERENTIAL METHOD BLD: ABNORMAL
EOSINOPHIL # BLD: 0.01 K/UL (ref 0–0.4)
EOSINOPHIL NFR BLD: 0.1 % (ref 0–7)
ERYTHROCYTE [DISTWIDTH] IN BLOOD BY AUTOMATED COUNT: 12.3 % (ref 11.5–14.5)
ETHANOL SERPL-MCNC: <10 MG/DL (ref 0–0.08)
GLOBULIN SER CALC-MCNC: 3.6 G/DL (ref 2–4)
GLUCOSE SERPL-MCNC: 124 MG/DL (ref 65–100)
HCT VFR BLD AUTO: 52.5 % (ref 36.6–50.3)
HGB BLD-MCNC: 18.5 G/DL (ref 12.1–17)
IMM GRANULOCYTES # BLD AUTO: 0.06 K/UL (ref 0–0.04)
IMM GRANULOCYTES NFR BLD AUTO: 0.7 % (ref 0–0.5)
LIPASE SERPL-CCNC: 52 U/L (ref 13–75)
LYMPHOCYTES # BLD: 0.96 K/UL (ref 0.8–3.5)
LYMPHOCYTES NFR BLD: 10.9 % (ref 12–49)
MCH RBC QN AUTO: 31 PG (ref 26–34)
MCHC RBC AUTO-ENTMCNC: 35.2 G/DL (ref 30–36.5)
MCV RBC AUTO: 88.1 FL (ref 80–99)
MONOCYTES # BLD: 0.95 K/UL (ref 0–1)
MONOCYTES NFR BLD: 10.8 % (ref 5–13)
NEUTS SEG # BLD: 6.81 K/UL (ref 1.8–8)
NEUTS SEG NFR BLD: 77.2 % (ref 32–75)
NRBC # BLD: 0 K/UL (ref 0–0.01)
NRBC BLD-RTO: 0 PER 100 WBC
PLATELET # BLD AUTO: 268 K/UL (ref 150–400)
PMV BLD AUTO: 9.5 FL (ref 8.9–12.9)
POTASSIUM SERPL-SCNC: 3.7 MMOL/L (ref 3.5–5.1)
PROT SERPL-MCNC: 8.1 G/DL (ref 6.4–8.2)
RBC # BLD AUTO: 5.96 M/UL (ref 4.1–5.7)
SODIUM SERPL-SCNC: 137 MMOL/L (ref 136–145)
SPECIMEN HOLD: NORMAL
WBC # BLD AUTO: 8.8 K/UL (ref 4.1–11.1)

## 2025-04-17 PROCEDURE — 82077 ASSAY SPEC XCP UR&BREATH IA: CPT

## 2025-04-17 PROCEDURE — 80053 COMPREHEN METABOLIC PANEL: CPT

## 2025-04-17 PROCEDURE — 85025 COMPLETE CBC W/AUTO DIFF WBC: CPT

## 2025-04-17 PROCEDURE — 6370000000 HC RX 637 (ALT 250 FOR IP): Performed by: STUDENT IN AN ORGANIZED HEALTH CARE EDUCATION/TRAINING PROGRAM

## 2025-04-17 PROCEDURE — 83690 ASSAY OF LIPASE: CPT

## 2025-04-17 PROCEDURE — 2580000003 HC RX 258: Performed by: STUDENT IN AN ORGANIZED HEALTH CARE EDUCATION/TRAINING PROGRAM

## 2025-04-17 PROCEDURE — 6360000002 HC RX W HCPCS: Performed by: STUDENT IN AN ORGANIZED HEALTH CARE EDUCATION/TRAINING PROGRAM

## 2025-04-17 RX ORDER — CHLORDIAZEPOXIDE HYDROCHLORIDE 25 MG/1
25 CAPSULE, GELATIN COATED ORAL ONCE
Status: COMPLETED | OUTPATIENT
Start: 2025-04-17 | End: 2025-04-17

## 2025-04-17 RX ORDER — LORAZEPAM 1 MG/1
3 TABLET ORAL
Status: DISCONTINUED | OUTPATIENT
Start: 2025-04-17 | End: 2025-04-17 | Stop reason: HOSPADM

## 2025-04-17 RX ORDER — LORAZEPAM 2 MG/ML
2 INJECTION INTRAMUSCULAR
Status: DISCONTINUED | OUTPATIENT
Start: 2025-04-17 | End: 2025-04-17 | Stop reason: HOSPADM

## 2025-04-17 RX ORDER — LORAZEPAM 1 MG/1
2 TABLET ORAL
Status: DISCONTINUED | OUTPATIENT
Start: 2025-04-17 | End: 2025-04-17 | Stop reason: HOSPADM

## 2025-04-17 RX ORDER — LORAZEPAM 2 MG/ML
1 INJECTION INTRAMUSCULAR
Status: DISCONTINUED | OUTPATIENT
Start: 2025-04-17 | End: 2025-04-17 | Stop reason: HOSPADM

## 2025-04-17 RX ORDER — LORAZEPAM 1 MG/1
4 TABLET ORAL
Status: DISCONTINUED | OUTPATIENT
Start: 2025-04-17 | End: 2025-04-17 | Stop reason: HOSPADM

## 2025-04-17 RX ORDER — SODIUM CHLORIDE 0.9 % (FLUSH) 0.9 %
5-40 SYRINGE (ML) INJECTION EVERY 12 HOURS SCHEDULED
Status: DISCONTINUED | OUTPATIENT
Start: 2025-04-17 | End: 2025-04-17 | Stop reason: HOSPADM

## 2025-04-17 RX ORDER — LORAZEPAM 2 MG/ML
3 INJECTION INTRAMUSCULAR
Status: DISCONTINUED | OUTPATIENT
Start: 2025-04-17 | End: 2025-04-17 | Stop reason: HOSPADM

## 2025-04-17 RX ORDER — SODIUM CHLORIDE 0.9 % (FLUSH) 0.9 %
5-40 SYRINGE (ML) INJECTION PRN
Status: DISCONTINUED | OUTPATIENT
Start: 2025-04-17 | End: 2025-04-17 | Stop reason: HOSPADM

## 2025-04-17 RX ORDER — LORAZEPAM 2 MG/ML
4 INJECTION INTRAMUSCULAR
Status: DISCONTINUED | OUTPATIENT
Start: 2025-04-17 | End: 2025-04-17 | Stop reason: HOSPADM

## 2025-04-17 RX ORDER — LANOLIN ALCOHOL/MO/W.PET/CERES
100 CREAM (GRAM) TOPICAL DAILY
Status: DISCONTINUED | OUTPATIENT
Start: 2025-04-17 | End: 2025-04-17 | Stop reason: HOSPADM

## 2025-04-17 RX ORDER — SODIUM CHLORIDE 9 MG/ML
INJECTION, SOLUTION INTRAVENOUS PRN
Status: DISCONTINUED | OUTPATIENT
Start: 2025-04-17 | End: 2025-04-17 | Stop reason: HOSPADM

## 2025-04-17 RX ORDER — 0.9 % SODIUM CHLORIDE 0.9 %
1000 INTRAVENOUS SOLUTION INTRAVENOUS ONCE
Status: COMPLETED | OUTPATIENT
Start: 2025-04-17 | End: 2025-04-17

## 2025-04-17 RX ORDER — LORAZEPAM 1 MG/1
1 TABLET ORAL
Status: DISCONTINUED | OUTPATIENT
Start: 2025-04-17 | End: 2025-04-17 | Stop reason: HOSPADM

## 2025-04-17 RX ORDER — CHLORDIAZEPOXIDE HYDROCHLORIDE 25 MG/1
CAPSULE, GELATIN COATED ORAL
Qty: 15 CAPSULE | Refills: 0 | Status: ON HOLD | OUTPATIENT
Start: 2025-04-17 | End: 2025-04-24 | Stop reason: HOSPADM

## 2025-04-17 RX ADMIN — SODIUM CHLORIDE 1000 ML: 0.9 INJECTION, SOLUTION INTRAVENOUS at 02:06

## 2025-04-17 RX ADMIN — LORAZEPAM 2 MG: 2 INJECTION INTRAMUSCULAR; INTRAVENOUS at 02:06

## 2025-04-17 RX ADMIN — CHLORDIAZEPOXIDE HYDROCHLORIDE 25 MG: 25 CAPSULE ORAL at 04:52

## 2025-04-17 ASSESSMENT — ENCOUNTER SYMPTOMS
SHORTNESS OF BREATH: 0
SORE THROAT: 0
DIARRHEA: 0
ABDOMINAL PAIN: 0
NAUSEA: 1
VOMITING: 0
COUGH: 0
EYE PAIN: 0

## 2025-04-17 ASSESSMENT — PAIN - FUNCTIONAL ASSESSMENT: PAIN_FUNCTIONAL_ASSESSMENT: NONE - DENIES PAIN

## 2025-04-17 NOTE — ED PROVIDER NOTES
[facility] EMERGENCY DEPARTMENT  EMERGENCY DEPARTMENT ENCOUNTER      Pt Name: Hilario Burkett  MRN: [id number]  Birthdate 1990  Date of evaluation: 4/17/2025  Provider: FAUSTINO TRENT    CHIEF COMPLAINT       Chief Complaint   Patient presents with    Alcohol Intoxication    Withdrawal         HISTORY OF PRESENT ILLNESS   (Location/Symptom, Timing/Onset, Context/Setting, Quality, Duration, Modifying Factors, Severity)  Note limiting factors.   35 y.o. male presents to ED with alcohol withdrawal. Patient was admitted to this hospital from 3/29/2025 - 3/31/2025 for alcohol withdrawal, was weaned using Librium taper, was discharged with librium and ativan. Patient reports at that time he was in much worse shape than he is at this time. He reports that since being discharged he has relapsed and has not been able to set up outpatient care. He reports that he was drinking beers and wine, reports that he only drank about 5 beers yesterday. His last drink was about an hour ago. He reports that he feels like he is shaking, has palpitations, and anxiety. He reports that he is trying to be more proactive this time. Denies any SI, HI, vomiting, CP, SOB, hallucinations. He reports that he was taking some librium and ativan to help with some of his anxiety. Denies any tobacco or drug use.     The history is provided by the patient.         Review of External Medical Records:     Nursing Notes were reviewed.    REVIEW OF SYSTEMS    (2-9 systems for level 4, 10 or more for level 5)     Review of Systems   Constitutional:  Negative for chills and fever.   HENT:  Negative for congestion, ear pain and sore throat.    Eyes:  Negative for pain.   Respiratory:  Negative for cough and shortness of breath.    Cardiovascular:  Negative for chest pain.   Gastrointestinal:  Positive for nausea. Negative for abdominal pain, diarrhea and vomiting.   Genitourinary:  Negative for dysuria and flank pain.   Musculoskeletal:  Negative  of this dictation.    EMERGENCY DEPARTMENT COURSE and DIFFERENTIAL DIAGNOSIS/MDM:   Vitals:    Vitals:    04/17/25 0129   BP: (!) 179/136   Pulse: (!) 114   Resp: 16   Temp: 98.6 °F (37 °C)   TempSrc: Oral   SpO2: 96%   Weight: 80 kg (176 lb 5.9 oz)   Height: 1.829 m (6')           Medical Decision Making  35 y.o. male presents to ED with concern for alcohol withdrawal. Vital signs notable for elevated BP and HR in triage. Physical exam unremarkable, with patient well-appearing, no acute distress. Heart sounds normal and lungs CTAB. No focal neuro deficits. Labs with negative EtOH level.     Patient will be signed out at shift change to Darcy Denney MD with further labs and reassessment pending. Pending these results patient's disposition will be determined. Results and findings up to this point have been communicated to patient and family members. Patient verbalizes understanding and no further concerns at this time. Patient has been informed that care will be completed by new provider. No socioeconomic barriers to care identified during this visit. Please see separate documentation for further care and results rendered during this ER visit.       Amount and/or Complexity of Data Reviewed  Labs: ordered.    Risk  OTC drugs.  Prescription drug management.            REASSESSMENT            CONSULTS:  None    PROCEDURES:  Unless otherwise noted below, none     Procedures      FINAL IMPRESSION      1. Alcohol dependence with uncomplicated withdrawal (HCC)          DISPOSITION/PLAN   DISPOSITION        PATIENT REFERRED TO:  [facility]  [address] [facility] [facility] [address] [phone]  Schedule an appointment as soon as possible for a visit       [facility]  [address] [place] [address] [phone]  Go to   If symptoms worsen or change      DISCHARGE MEDICATIONS:  New Prescriptions    No medications on file         (Please note that portions of this

## 2025-04-17 NOTE — ED TRIAGE NOTES
Pt walked into the ED with CC of alcohol withdrawal. Pt last drink was at 0000 (beer). Pt has some hand tremors, nausea, palpations, and feeling of doom. Pt tried to withdrawal at home this week but was unable to.     Pt also has some people who want to hurt his friend and he has been stressed about this. The stress lead him to relapse.

## 2025-04-18 ENCOUNTER — HOSPITAL ENCOUNTER (INPATIENT)
Facility: HOSPITAL | Age: 35
LOS: 5 days | Discharge: HOME | DRG: 885 | End: 2025-04-24
Attending: EMERGENCY MEDICINE | Admitting: PSYCHIATRY & NEUROLOGY
Payer: COMMERCIAL

## 2025-04-18 DIAGNOSIS — F10.220 ALCOHOL DEPENDENCE WITH UNCOMPLICATED INTOXICATION (HCC): ICD-10-CM

## 2025-04-18 DIAGNOSIS — R45.851 SUICIDAL IDEATION: Primary | ICD-10-CM

## 2025-04-18 DIAGNOSIS — E86.0 DEHYDRATION: ICD-10-CM

## 2025-04-18 LAB
ALBUMIN SERPL-MCNC: 4.1 G/DL (ref 3.5–5)
ALBUMIN/GLOB SERPL: 1.1 (ref 1.1–2.2)
ALP SERPL-CCNC: 109 U/L (ref 45–117)
ALT SERPL-CCNC: 109 U/L (ref 12–78)
ANION GAP SERPL CALC-SCNC: 9 MMOL/L (ref 2–12)
APAP SERPL-MCNC: <2 UG/ML (ref 10–30)
AST SERPL-CCNC: 85 U/L (ref 15–37)
BASOPHILS # BLD: 0.03 K/UL (ref 0–0.1)
BASOPHILS NFR BLD: 0.4 % (ref 0–1)
BILIRUB SERPL-MCNC: 0.8 MG/DL (ref 0.2–1)
BUN SERPL-MCNC: 6 MG/DL (ref 6–20)
BUN/CREAT SERPL: 8 (ref 12–20)
CALCIUM SERPL-MCNC: 9.1 MG/DL (ref 8.5–10.1)
CHLORIDE SERPL-SCNC: 108 MMOL/L (ref 97–108)
CO2 SERPL-SCNC: 24 MMOL/L (ref 21–32)
CREAT SERPL-MCNC: 0.76 MG/DL (ref 0.7–1.3)
DIFFERENTIAL METHOD BLD: ABNORMAL
EOSINOPHIL # BLD: 0.12 K/UL (ref 0–0.4)
EOSINOPHIL NFR BLD: 1.6 % (ref 0–7)
ERYTHROCYTE [DISTWIDTH] IN BLOOD BY AUTOMATED COUNT: 12.4 % (ref 11.5–14.5)
ETHANOL SERPL-MCNC: 34 MG/DL (ref 0–0.08)
GLOBULIN SER CALC-MCNC: 3.6 G/DL (ref 2–4)
GLUCOSE SERPL-MCNC: 110 MG/DL (ref 65–100)
HCT VFR BLD AUTO: 50.8 % (ref 36.6–50.3)
HGB BLD-MCNC: 18.1 G/DL (ref 12.1–17)
IMM GRANULOCYTES # BLD AUTO: 0.04 K/UL (ref 0–0.04)
IMM GRANULOCYTES NFR BLD AUTO: 0.5 % (ref 0–0.5)
LYMPHOCYTES # BLD: 1.74 K/UL (ref 0.8–3.5)
LYMPHOCYTES NFR BLD: 23.6 % (ref 12–49)
MCH RBC QN AUTO: 31.3 PG (ref 26–34)
MCHC RBC AUTO-ENTMCNC: 35.6 G/DL (ref 30–36.5)
MCV RBC AUTO: 87.7 FL (ref 80–99)
MONOCYTES # BLD: 0.82 K/UL (ref 0–1)
MONOCYTES NFR BLD: 11.1 % (ref 5–13)
NEUTS SEG # BLD: 4.62 K/UL (ref 1.8–8)
NEUTS SEG NFR BLD: 62.8 % (ref 32–75)
NRBC # BLD: 0 K/UL (ref 0–0.01)
NRBC BLD-RTO: 0 PER 100 WBC
PLATELET # BLD AUTO: 218 K/UL (ref 150–400)
PMV BLD AUTO: 9.5 FL (ref 8.9–12.9)
POTASSIUM SERPL-SCNC: 3.6 MMOL/L (ref 3.5–5.1)
PROT SERPL-MCNC: 7.7 G/DL (ref 6.4–8.2)
RBC # BLD AUTO: 5.79 M/UL (ref 4.1–5.7)
SALICYLATES SERPL-MCNC: <1.7 MG/DL (ref 2.8–20)
SODIUM SERPL-SCNC: 141 MMOL/L (ref 136–145)
WBC # BLD AUTO: 7.4 K/UL (ref 4.1–11.1)

## 2025-04-18 PROCEDURE — 96361 HYDRATE IV INFUSION ADD-ON: CPT

## 2025-04-18 PROCEDURE — 99285 EMERGENCY DEPT VISIT HI MDM: CPT

## 2025-04-18 PROCEDURE — 80053 COMPREHEN METABOLIC PANEL: CPT

## 2025-04-18 PROCEDURE — 80143 DRUG ASSAY ACETAMINOPHEN: CPT

## 2025-04-18 PROCEDURE — 80179 DRUG ASSAY SALICYLATE: CPT

## 2025-04-18 PROCEDURE — 85025 COMPLETE CBC W/AUTO DIFF WBC: CPT

## 2025-04-18 PROCEDURE — 96360 HYDRATION IV INFUSION INIT: CPT

## 2025-04-18 PROCEDURE — 82077 ASSAY SPEC XCP UR&BREATH IA: CPT

## 2025-04-18 RX ORDER — 0.9 % SODIUM CHLORIDE 0.9 %
1000 INTRAVENOUS SOLUTION INTRAVENOUS ONCE
Status: COMPLETED | OUTPATIENT
Start: 2025-04-18 | End: 2025-04-19

## 2025-04-18 ASSESSMENT — PAIN - FUNCTIONAL ASSESSMENT: PAIN_FUNCTIONAL_ASSESSMENT: NONE - DENIES PAIN

## 2025-04-19 PROBLEM — F32.A DEPRESSION: Status: ACTIVE | Noted: 2025-04-19

## 2025-04-19 LAB
AMPHET UR QL SCN: NEGATIVE
BARBITURATES UR QL SCN: NEGATIVE
BENZODIAZ UR QL: POSITIVE
CANNABINOIDS UR QL SCN: POSITIVE
COCAINE UR QL SCN: NEGATIVE
Lab: ABNORMAL
METHADONE UR QL: NEGATIVE
OPIATES UR QL: NEGATIVE
PCP UR QL: NEGATIVE
SPECIMEN HOLD: NORMAL

## 2025-04-19 PROCEDURE — 6370000000 HC RX 637 (ALT 250 FOR IP): Performed by: PSYCHIATRY & NEUROLOGY

## 2025-04-19 PROCEDURE — 80307 DRUG TEST PRSMV CHEM ANLYZR: CPT

## 2025-04-19 PROCEDURE — 1240000000 HC EMOTIONAL WELLNESS R&B

## 2025-04-19 PROCEDURE — 2580000003 HC RX 258

## 2025-04-19 PROCEDURE — 6370000000 HC RX 637 (ALT 250 FOR IP): Performed by: STUDENT IN AN ORGANIZED HEALTH CARE EDUCATION/TRAINING PROGRAM

## 2025-04-19 RX ORDER — HYDROXYZINE HYDROCHLORIDE 50 MG/1
50 TABLET, FILM COATED ORAL 3 TIMES DAILY PRN
Status: DISCONTINUED | OUTPATIENT
Start: 2025-04-19 | End: 2025-04-24 | Stop reason: HOSPADM

## 2025-04-19 RX ORDER — ACETAMINOPHEN 325 MG/1
650 TABLET ORAL EVERY 4 HOURS PRN
Status: DISCONTINUED | OUTPATIENT
Start: 2025-04-19 | End: 2025-04-24 | Stop reason: HOSPADM

## 2025-04-19 RX ORDER — MAGNESIUM HYDROXIDE/ALUMINUM HYDROXICE/SIMETHICONE 120; 1200; 1200 MG/30ML; MG/30ML; MG/30ML
30 SUSPENSION ORAL EVERY 6 HOURS PRN
Status: DISCONTINUED | OUTPATIENT
Start: 2025-04-19 | End: 2025-04-24 | Stop reason: HOSPADM

## 2025-04-19 RX ORDER — CHLORDIAZEPOXIDE HYDROCHLORIDE 25 MG/1
25 CAPSULE, GELATIN COATED ORAL EVERY 12 HOURS
Status: COMPLETED | OUTPATIENT
Start: 2025-04-19 | End: 2025-04-20

## 2025-04-19 RX ORDER — SENNOSIDES A AND B 8.6 MG/1
1 TABLET, FILM COATED ORAL DAILY PRN
Status: DISCONTINUED | OUTPATIENT
Start: 2025-04-19 | End: 2025-04-24 | Stop reason: HOSPADM

## 2025-04-19 RX ORDER — POLYETHYLENE GLYCOL 3350 17 G/17G
17 POWDER, FOR SOLUTION ORAL DAILY PRN
Status: DISCONTINUED | OUTPATIENT
Start: 2025-04-19 | End: 2025-04-24 | Stop reason: HOSPADM

## 2025-04-19 RX ORDER — DIPHENHYDRAMINE HYDROCHLORIDE 50 MG/ML
50 INJECTION, SOLUTION INTRAMUSCULAR; INTRAVENOUS EVERY 4 HOURS PRN
Status: DISCONTINUED | OUTPATIENT
Start: 2025-04-19 | End: 2025-04-24 | Stop reason: HOSPADM

## 2025-04-19 RX ORDER — HALOPERIDOL 5 MG/1
5 TABLET ORAL EVERY 4 HOURS PRN
Status: DISCONTINUED | OUTPATIENT
Start: 2025-04-19 | End: 2025-04-24 | Stop reason: HOSPADM

## 2025-04-19 RX ORDER — TRAZODONE HYDROCHLORIDE 50 MG/1
50 TABLET ORAL NIGHTLY PRN
Status: DISCONTINUED | OUTPATIENT
Start: 2025-04-19 | End: 2025-04-24 | Stop reason: HOSPADM

## 2025-04-19 RX ORDER — HALOPERIDOL 5 MG/ML
5 INJECTION INTRAMUSCULAR EVERY 4 HOURS PRN
Status: DISCONTINUED | OUTPATIENT
Start: 2025-04-19 | End: 2025-04-24 | Stop reason: HOSPADM

## 2025-04-19 RX ORDER — CHLORDIAZEPOXIDE HYDROCHLORIDE 25 MG/1
25 CAPSULE, GELATIN COATED ORAL ONCE
Status: COMPLETED | OUTPATIENT
Start: 2025-04-19 | End: 2025-04-19

## 2025-04-19 RX ORDER — CHLORDIAZEPOXIDE HYDROCHLORIDE 25 MG/1
25 CAPSULE, GELATIN COATED ORAL EVERY 12 HOURS
Status: DISCONTINUED | OUTPATIENT
Start: 2025-04-19 | End: 2025-04-19 | Stop reason: SDUPTHER

## 2025-04-19 RX ADMIN — CHLORDIAZEPOXIDE HYDROCHLORIDE 25 MG: 25 CAPSULE ORAL at 19:25

## 2025-04-19 RX ADMIN — CHLORDIAZEPOXIDE HYDROCHLORIDE 25 MG: 25 CAPSULE ORAL at 07:26

## 2025-04-19 RX ADMIN — SODIUM CHLORIDE 1000 ML: 9 INJECTION, SOLUTION INTRAVENOUS at 03:59

## 2025-04-19 ASSESSMENT — PAIN - FUNCTIONAL ASSESSMENT: PAIN_FUNCTIONAL_ASSESSMENT: 0-10

## 2025-04-19 ASSESSMENT — SLEEP AND FATIGUE QUESTIONNAIRES
DO YOU USE A SLEEP AID: NO
SLEEP PATTERN: DIFFICULTY FALLING ASLEEP;INSOMNIA;RESTLESSNESS;DISTURBED/INTERRUPTED SLEEP
DO YOU HAVE DIFFICULTY SLEEPING: YES
AVERAGE NUMBER OF SLEEP HOURS: 3

## 2025-04-19 ASSESSMENT — LIFESTYLE VARIABLES
HOW MANY STANDARD DRINKS CONTAINING ALCOHOL DO YOU HAVE ON A TYPICAL DAY: 5 OR 6
HOW OFTEN DO YOU HAVE A DRINK CONTAINING ALCOHOL: 2-3 TIMES A WEEK

## 2025-04-19 ASSESSMENT — PAIN SCALES - GENERAL
PAINLEVEL_OUTOF10: 0
PAINLEVEL_OUTOF10: 0

## 2025-04-19 NOTE — FLOWSHEET NOTE
Given to patient underwear x2, shorts, flannel button up, t shirts x2, paul jacket, socks x2 and 4 paperback books

## 2025-04-19 NOTE — ED TRIAGE NOTES
Patient arrives ambulatory with c/o anxiety and suicidal ideation without a plan. States he has relapsed on alcohol due to anxiety over his safety after \"turning dangerous individuals into the police\". States he wants to be put on anxiety medication. Denies any withdrawal symptoms currently, last drink was earlier today.

## 2025-04-19 NOTE — ED PROVIDER NOTES
signed)  Emergency Attending Physician / Physician Assistant / Nurse Practitioner     Glenis Jimenez, APRN - NP  04/20/25 1120

## 2025-04-19 NOTE — PLAN OF CARE
Problem: Drug Abuse/Detox  Goal: Will have no detox symptoms and will verbalize plan for changing drug-related behavior  Description: INTERVENTIONS:1. Administer medication as ordered2. Monitor physical status3. Provide emotional support with 1:1 interaction with staff4. Encourage  recovery focused treatment   Flowsheets (Taken 4/19/2025 0920)  Will have no detox symptoms and will verbalize plan for changing drug-related behavior: Administer medication as ordered  Note: Pt participated in treatment team. Out on the unit watching TV. Stated he is feeling depressed due to threat on his life. Complaining of anxiety. Stated he has had poor sleep.

## 2025-04-19 NOTE — VIRTUAL HEALTH
Hilario Burkett  [id number]  1990     EMERGENCY DEPARTMENT TELEPSYCHIATRY EVALUATION    04/18/25    Chief Complaint:  “relapse/ anxiety”    Per chart review, \"Patient arrives ambulatory with c/o anxiety and suicidal ideation without a plan. States he has relapsed on alcohol due to anxiety over his safety after \"turning dangerous individuals into the police\". States he wants to be put on anxiety medication. Denies any withdrawal symptoms currently, last drink was earlier today. \"    HPI: Patient is a 35 y.o.  male who presents for suicidal ideation/ withdrawal. Patient presented to the ED on 04/18/25 from home    States earlier this month had complaints of neuropathy and passing out states was given librium ativan as well as Librium to manage alcohol withdrawal patient noticed at that time anxiety was becoming very difficult to manage as well as his depression started to increase    States then ran into interesting individual who were violent then found out he went to police regarding claims made by these violent individuals states detectives became very interested in what he had to say however these individuals then found out patient went to the police and outpatient is increasingly concerned for his safety    States had left over librium and ativan then ran out and relapsed.  Patient reports last taking Librium 50 mg just prior to ED presentation as well as drinking earlier today    Seeking more librium ativan more supervision as well as recovery plan     Endorses suicidal ideation, no plan or intent states just does not feel he is able to keep self safe.  Feels lost declining quality of life needs assistance    Endorses anxiety 10/10 prior to taking librium prior to ED presentation    States mental health decompensation has affected quality of life.  Requesting voluntary admission to inpatient psychiatric facility as well as assistance with substance abuse disorder    Past Psychiatric

## 2025-04-19 NOTE — VIRTUAL HEALTH
Reason for Cancel:    Awaiting KI; patient roomed 23 minutes after consult placed     Hilario ESTELA Burkett, was evaluated through a synchronous (real-time) audio-video encounter. The patient (and/or guardian if applicable) is aware that this is a billable service, which includes applicable co-pays. This virtual visit was conducted with patient's (and/or legal guardian's) consent. Patient identification was verified, and a caregiver was present when appropriate.  The patient was located at Facility (Appt Department): [facility] EMERGENCY DEPARTMENT  [address] [place] [address]  Loc: [phone]  The provider was located at Home (City/State): [facility]  Confirm you are appropriately licensed, registered, or certified to deliver care in the state where the patient is located as indicated above. If you are not or unsure, please re-schedule the visit: Yes, I confirm.   Consults     Total time spent on this encounter:  0    --JEREMIE Diez - CNP on 4/18/2025 at 9:05 PM    An electronic signature was used to authenticate this note.

## 2025-04-19 NOTE — ED NOTES
TRANSFER - OUT REPORT:    Verbal report given to Ana Luisa VACA on Hilario Burkett  being transferred to [facility] for routine progression of patient care       Report consisted of patient's Situation, Background, Assessment and   Recommendations(SBAR).     Information from the following report(s) Nurse Handoff Report and Event Log was reviewed with the receiving nurse.    [place] Fall Assessment:    Presents to emergency department  because of falls (Syncope, seizure, or loss of consciousness): No  Age > 70: No  Altered Mental Status, Intoxication with alcohol or substance confusion (Disorientation, impaired judgment, poor safety awaremess, or inability to follow instructions): No  Impaired Mobility: Ambulates or transfers with assistive devices or assistance; Unable to ambulate or transer.: No  Nursing Judgement: No          Lines:   Peripheral IV 04/19/25 Right Antecubital (Active)        Opportunity for questions and clarification was provided.      Patient transported with:  Security and sitter

## 2025-04-19 NOTE — H&P
Department of Psychiatry  Attending History and Physical - Adult         CHIEF COMPLAINT:  \"I was fearful for my safety.\"    History obtained from:  patient, electronic medical record    HISTORY OF PRESENT ILLNESS:          The patient is a 35 y.o. male with significant past medical history of   Past Medical History:   Diagnosis Date    Psychiatric disorder     Anxeity      who presents with alcohol withdrawal, SI (on admission). During interview he states he is not having withdrawal sx and increasingly focuses on threats to his safety including \"other musicians\" who have made direct threats to him via phone and social media. He states no SI or HI. States that he wishes to be discharged AMA, and when informed that medically the recommendation  is to stay for treatment and stabilization becomes focused on the safety of himself and others, and that he has \"multiple detectives\" he is working with to work on the case. He will be assessed by [facility] for safety/TDO assessment and is aware of the process.    PSYCHIATRIC HISTORY:      The patient is not currently receiving care for the above psychiatric illness.    Past mental health outpatient care includes:  No previous outpatient care    Past mental health hospitalizations: at least one inpatient stay      Past psychiatric medications include:  Other:  librium taper        Past Medical History:        Diagnosis Date    Psychiatric disorder     Anxeity     Past Surgical History:    No past surgical history on file.  Medications Prior to Admission:   Medications Prior to Admission: chlordiazePOXIDE (LIBRIUM) 25 MG capsule, Day 1: 50mg q6h Day 2: 25mg q6h Day 3: 25mg q12h Day 4: 25mg at night  [DISCONTINUED] LORazepam (ATIVAN) 1 MG tablet, Take 1 tablet by mouth every 8 hours as needed for Anxiety for up to 30 days. Max Daily Amount: 3 mg  Allergies:  Penicillins    Social History:  Works in tech support, full-time. Lives alone in an apartment.    Family History:   No family

## 2025-04-20 PROCEDURE — 1240000000 HC EMOTIONAL WELLNESS R&B

## 2025-04-20 PROCEDURE — 93005 ELECTROCARDIOGRAM TRACING: CPT | Performed by: PSYCHIATRY & NEUROLOGY

## 2025-04-20 PROCEDURE — 6370000000 HC RX 637 (ALT 250 FOR IP): Performed by: PSYCHIATRY & NEUROLOGY

## 2025-04-20 RX ORDER — PHENOBARBITAL 32.4 MG/1
16.2 TABLET ORAL 2 TIMES DAILY
Status: DISCONTINUED | OUTPATIENT
Start: 2025-04-22 | End: 2025-04-21

## 2025-04-20 RX ORDER — PHENOBARBITAL 32.4 MG/1
32.4 TABLET ORAL EVERY 6 HOURS PRN
Status: ACTIVE | OUTPATIENT
Start: 2025-04-20 | End: 2025-04-22

## 2025-04-20 RX ORDER — LANOLIN ALCOHOL/MO/W.PET/CERES
100 CREAM (GRAM) TOPICAL DAILY
Status: DISCONTINUED | OUTPATIENT
Start: 2025-04-20 | End: 2025-04-24 | Stop reason: HOSPADM

## 2025-04-20 RX ORDER — PHENOBARBITAL 32.4 MG/1
32.4 TABLET ORAL 2 TIMES DAILY
Status: DISCONTINUED | OUTPATIENT
Start: 2025-04-21 | End: 2025-04-21

## 2025-04-20 RX ORDER — PHENOBARBITAL 32.4 MG/1
16.2 TABLET ORAL EVERY 6 HOURS PRN
Status: ACTIVE | OUTPATIENT
Start: 2025-04-22 | End: 2025-04-23

## 2025-04-20 RX ORDER — CLONIDINE HYDROCHLORIDE 0.1 MG/1
0.1 TABLET ORAL EVERY 8 HOURS PRN
Status: DISCONTINUED | OUTPATIENT
Start: 2025-04-20 | End: 2025-04-24 | Stop reason: HOSPADM

## 2025-04-20 RX ORDER — PHENOBARBITAL 32.4 MG/1
32.4 TABLET ORAL 4 TIMES DAILY
Status: DISCONTINUED | OUTPATIENT
Start: 2025-04-20 | End: 2025-04-21

## 2025-04-20 RX ORDER — MULTIVITAMIN WITH IRON
1 TABLET ORAL DAILY
Status: DISCONTINUED | OUTPATIENT
Start: 2025-04-20 | End: 2025-04-24 | Stop reason: HOSPADM

## 2025-04-20 RX ORDER — FOLIC ACID 1 MG/1
1 TABLET ORAL DAILY
Status: DISCONTINUED | OUTPATIENT
Start: 2025-04-20 | End: 2025-04-24 | Stop reason: HOSPADM

## 2025-04-20 RX ADMIN — PHENOBARBITAL 32.4 MG: 32.4 TABLET ORAL at 17:18

## 2025-04-20 RX ADMIN — PHENOBARBITAL 32.4 MG: 32.4 TABLET ORAL at 20:47

## 2025-04-20 RX ADMIN — PHENOBARBITAL 32.4 MG: 32.4 TABLET ORAL at 13:02

## 2025-04-20 RX ADMIN — CHLORDIAZEPOXIDE HYDROCHLORIDE 25 MG: 25 CAPSULE ORAL at 06:30

## 2025-04-20 RX ADMIN — MULTIVITAMIN TABLET 1 TABLET: TABLET at 09:26

## 2025-04-20 RX ADMIN — Medication 100 MG: at 09:26

## 2025-04-20 RX ADMIN — CLONIDINE HYDROCHLORIDE 0.1 MG: 0.1 TABLET ORAL at 09:26

## 2025-04-20 RX ADMIN — Medication 1 MG: at 09:26

## 2025-04-20 NOTE — PLAN OF CARE
Problem: Anxiety  Goal: Will report anxiety at manageable levels  Description: INTERVENTIONS:1. Administer medication as ordered2. Teach and rehearse alternative coping skills3. Provide emotional support with 1:1 interaction with staff  4/20/2025 0909 by Loli Chen RN  Flowsheets  Taken 4/20/2025 0906  Will report anxiety at manageable levels: Administer medication as ordered  Taken 4/20/2025 0813  Will report anxiety at manageable levels: Administer medication as ordered  Note: Pt participated in treatment team. Out on the unit and interacting with select peers. Continues to worry about his safety in the community. Cooperative with staff. Denies SI/HI at this time.

## 2025-04-20 NOTE — PLAN OF CARE
Problem: Anxiety  Goal: Will report anxiety at manageable levels  Description: INTERVENTIONS:1. Administer medication as ordered2. Teach and rehearse alternative coping skills3. Provide emotional support with 1:1 interaction with staff  4/20/2025 1708 by Carmela Cruz, RN  Outcome: Progressing

## 2025-04-20 NOTE — PLAN OF CARE
Patient resting in bed with eyes closed. No needs voiced to staff at this time, no respiratory distress noted. Patient in NAD, and monitored with 15 minute safety rounds.    Problem: Anxiety  Goal: Will report anxiety at manageable levels  Description: INTERVENTIONS:1. Administer medication as ordered2. Teach and rehearse alternative coping skills3. Provide emotional support with 1:1 interaction with staff  Outcome: Progressing     Problem: Sleep Disturbance  Goal: Will exhibit normal sleeping pattern  Description: INTERVENTIONS:1. Administer medication as ordered2. Decrease environmental stimuli, including noise, as appropriate3. Discourage social isolation and naps during the day  Outcome: Progressing

## 2025-04-21 LAB
EKG ATRIAL RATE: 73 BPM
EKG DIAGNOSIS: NORMAL
EKG P AXIS: 35 DEGREES
EKG P-R INTERVAL: 130 MS
EKG Q-T INTERVAL: 388 MS
EKG QRS DURATION: 104 MS
EKG QTC CALCULATION (BAZETT): 427 MS
EKG R AXIS: 25 DEGREES
EKG T AXIS: 25 DEGREES
EKG VENTRICULAR RATE: 73 BPM

## 2025-04-21 PROCEDURE — 6370000000 HC RX 637 (ALT 250 FOR IP): Performed by: PSYCHIATRY & NEUROLOGY

## 2025-04-21 PROCEDURE — 1240000000 HC EMOTIONAL WELLNESS R&B

## 2025-04-21 RX ORDER — PHENOBARBITAL 32.4 MG/1
16.2 TABLET ORAL EVERY 6 HOURS PRN
Status: DISCONTINUED | OUTPATIENT
Start: 2025-04-24 | End: 2025-04-24 | Stop reason: HOSPADM

## 2025-04-21 RX ORDER — PHENOBARBITAL 32.4 MG/1
32.4 TABLET ORAL 2 TIMES DAILY
Status: COMPLETED | OUTPATIENT
Start: 2025-04-23 | End: 2025-04-24

## 2025-04-21 RX ORDER — LANOLIN ALCOHOL/MO/W.PET/CERES
100 CREAM (GRAM) TOPICAL DAILY
Status: DISCONTINUED | OUTPATIENT
Start: 2025-04-21 | End: 2025-04-21

## 2025-04-21 RX ORDER — FOLIC ACID 1 MG/1
1 TABLET ORAL DAILY
Status: DISCONTINUED | OUTPATIENT
Start: 2025-04-21 | End: 2025-04-21

## 2025-04-21 RX ORDER — PHENOBARBITAL 64.8 MG/1
64.8 TABLET ORAL 4 TIMES DAILY
Status: DISPENSED | OUTPATIENT
Start: 2025-04-21 | End: 2025-04-22

## 2025-04-21 RX ORDER — PHENOBARBITAL 32.4 MG/1
32.4 TABLET ORAL 4 TIMES DAILY
Status: DISPENSED | OUTPATIENT
Start: 2025-04-22 | End: 2025-04-23

## 2025-04-21 RX ORDER — PHENOBARBITAL 32.4 MG/1
32.4 TABLET ORAL EVERY 6 HOURS PRN
Status: ACTIVE | OUTPATIENT
Start: 2025-04-22 | End: 2025-04-24

## 2025-04-21 RX ORDER — MULTIVITAMIN WITH IRON
1 TABLET ORAL DAILY
Status: DISCONTINUED | OUTPATIENT
Start: 2025-04-21 | End: 2025-04-21

## 2025-04-21 RX ORDER — PHENOBARBITAL 32.4 MG/1
16.2 TABLET ORAL 2 TIMES DAILY
Status: DISCONTINUED | OUTPATIENT
Start: 2025-04-24 | End: 2025-04-24 | Stop reason: HOSPADM

## 2025-04-21 RX ORDER — PHENOBARBITAL 64.8 MG/1
64.8 TABLET ORAL EVERY 6 HOURS PRN
Status: ACTIVE | OUTPATIENT
Start: 2025-04-21 | End: 2025-04-22

## 2025-04-21 RX ORDER — TRAZODONE HYDROCHLORIDE 50 MG/1
50 TABLET ORAL NIGHTLY
Status: DISCONTINUED | OUTPATIENT
Start: 2025-04-21 | End: 2025-04-24 | Stop reason: HOSPADM

## 2025-04-21 RX ADMIN — TRAZODONE HYDROCHLORIDE 50 MG: 50 TABLET ORAL at 22:00

## 2025-04-21 RX ADMIN — CLONIDINE HYDROCHLORIDE 0.1 MG: 0.1 TABLET ORAL at 17:38

## 2025-04-21 RX ADMIN — PHENOBARBITAL 64.8 MG: 64.8 TABLET ORAL at 13:30

## 2025-04-21 RX ADMIN — Medication 1 MG: at 07:08

## 2025-04-21 RX ADMIN — PHENOBARBITAL 32.4 MG: 32.4 TABLET ORAL at 07:08

## 2025-04-21 RX ADMIN — MULTIVITAMIN TABLET 1 TABLET: TABLET at 07:08

## 2025-04-21 RX ADMIN — Medication 100 MG: at 07:08

## 2025-04-21 RX ADMIN — CLONIDINE HYDROCHLORIDE 0.1 MG: 0.1 TABLET ORAL at 08:15

## 2025-04-21 RX ADMIN — PHENOBARBITAL 64.8 MG: 64.8 TABLET ORAL at 21:59

## 2025-04-21 NOTE — PLAN OF CARE
Problem: Nancy  Goal: Will exhibit normal sleep and speech and no impulsivity  Description: INTERVENTIONS:1. Administer medication as ordered2. Set limits on impulsive behavior3. Make attempts to decrease external stimuli as possible  Outcome: Progressing  Note: Out on unit demonstrating ability to follow unit routine, nursing direction and sharing common spaces. While on unit noted intrusive behaviors evidenced by involving self in peers conversations. Pressured speech noted when discussing his plan of care and self w peers. Easily shares delusional thinking to nursing staff. Poor insight into his behaviors and current symptoms such as delusional thinking. Minimal insight into his substance addiction with minimal motivation. Staff focus is on offering support and reassurance.

## 2025-04-21 NOTE — CARE COORDINATION
04/21/25 0756   ITP   Date of Plan 04/21/25   Date of Next Review 04/28/25   Primary Diagnosis Code depression / ETOH abuse   Barriers to Treatment Need for psychoeducation   Strengths Incorporated in Plan Acknowledging need for assistance;Seeking interactions   Plan of Care   Long Term Goal (LTG) Stated in patient/guardian terms feel safe outside the hospital   Short Term Goal 1   Short Term Goal 1 Client will remain safe during stay   Baseline Functioning SI without a plan   Target be free of SI   Objectives Client will participate in group therapy   Intervention  Acknowledge client strengths;Assess safety   Frequency daily   Measured by Behavioral data;Self report;Staff observation   Staff Responsible Clinical staff;[facility] staff   Intervention 2 Group therapy   Frequency daily   Measured by Behavioral data;Self report;Staff observation   Staff Responsible [facility] staff;Clinical staff   Intervention 3 Milieu therapy and support   Frequency daily   Measured by Behavioral data;Self report;Staff observation   Staff Responsible Clinical staff;[facility] staff   Short Term Goal 2   Short Term Goal 2 Client will maintain compliance with medication regime   Baseline Functioning not taking BH medications   Target take medications as prescribed on a daily basis   Objectives Other (comment)  (medication compliance)   Intervention  Monitor medications   Frequency daily   Measured by Behavioral data;Self report;Staff observation   Staff Responsible Clinical staff   Crisis/Safety/Discharge Plan   Crisis/Safety Plan Standard program interventions and protocol   Comprehensive Assessment Completion Date 04/21/25   Discharge Plan He will be referred to SB treatment program

## 2025-04-21 NOTE — CARE COORDINATION
04/21/25 1103   Suicidal Ideation   Wish to be Dead Lifetime - Yes;Past 1 month - No   Non-Specific Active Suicidal Thoughts Lifetime - Yes;Past 1 month - No   Suicidal Behavior Trigger Wish to be Dead   Active Suicidal Ideation with Any Methods (Not Plan) without Intent to Act Lifetime - Yes;Past 1 month - No   Active Suicidal Ideation with Some Intent to Act, without Specific Plan Lifetime - Yes;Past 1 month - No   Active Suicidal Ideation with Specific Plan and Intent Lifetime - Yes;Past 1 month - No   Intensity of Ideation   Lifetime - Most Severe Ideation 3   Lifetime - Most Recent Ideation 3   Suicidal Behavior   Actual Attempt Lifetime - Yes;Past 3 months - No   Total # of Attempts 2   Has subject engaged in Non-Suicidal Self-Injurious Behavior? Lifetime - Yes;Past 3 months - No   Interrupted Attempt Past 3 months - No;Lifetime - No   Actual Lethality/Medical Damage 2   Potential Lethality 1   Most Recent Attempt Date   (2015)   Initial/First Attempt Date   (2011)

## 2025-04-21 NOTE — PROGRESS NOTES
Behavioral Services  Medicare Certification Upon Admission    I certify that this patient's inpatient psychiatric hospital admission is medically necessary for:    [x] (1) Treatment which could reasonably be expected to improve this patient's condition,       [] (2) Or for diagnostic study;     AND     [x](2) The inpatient psychiatric services are provided while the individual is under the care of a physician and are included in the individualized plan of care.    Estimated length of stay/service 3-5 days.    Plan for post-hospital care outpatient psychiatry.    Electronically signed by Bety Foote MD on 4/21/2025 at 10:57 AM

## 2025-04-21 NOTE — PLAN OF CARE
Problem: ABCDS Injury Assessment  Goal: Absence of physical injury  Flowsheets (Taken 4/21/2025 0050)  Absence of Physical Injury: Implement safety measures based on patient assessment     Pt appears to be resting in bed in no apparent distress, respirations even and unlabored. No voiced concerns. Standard fall precautions maintained. Q15m rounds for safety continued per provider order.

## 2025-04-21 NOTE — PROGRESS NOTES
Admission Medication Reconciliation:    Information obtained from:  patient interview, Insurance claims data, review of EMR, and [facility]  RxQuery data available¹:  YES    Comments/Recommendations: Updated PTA meds/reviewed patient's allergies.    1)  The patient was hospitalized at [facility] (medical floor) 3/29-3/31. He was discharged on chlordiazepoxide at that time. On 4/17/25, he was given an additional prescription of chlordiazepoxide on 4/17/25.    The patient reports that he has been drinking 12-16 beers a day for the last month. UDS is positive for benzodiazepines (prescribed) and THC.     He reports that he had previous suicide attempts in 2011 and 2015. He was hospitalized at [facility] 5/18/11-5/20/11 and discharged on escitalopram 10 mg daily.     2)  The [place] Prescription Monitoring Program () was assessed to determine fill history of any controlled medications. The patient has filled the following controlled medications in the last  2 years.  - 4/17/25: chlordiazepoxide 25 mg, #15 for 4 day supply  - 3/31/25: chlordiazepoxide 25 mg, #7 for 5 day supply  - 3/31/25: lorazepam 1 mg, #10 for 30 day supply    3)  Medication changes (since last review):  Removed  - lorazepam   ¹RxQuery pharmacy benefit data reflects medications filled and processed through the patient's insurance, however this data does NOT capture whether the medication was picked up or is currently being taken by the patient.    Allergies:  Penicillins    Significant PMH/Disease States:   Past Medical History:   Diagnosis Date    Psychiatric disorder     Anxeity       Chief Complaint for this Admission:    Chief Complaint   Patient presents with    Mental Health Problem     Prior to Admission Medications:   Prior to Admission Medications   Prescriptions Last Dose Informant   chlordiazePOXIDE (LIBRIUM) 25 MG capsule 4/19/2025 Morning    Sig: Day 1: 50mg q6h Day 2: 25mg q6h Day 3: 25mg q12h Day 4: 25mg at night      Facility-Administered

## 2025-04-21 NOTE — DISCHARGE INSTRUCTIONS
DISCHARGE SUMMARY    NAME:Hilario Burkett  : 1990  MRN: [id number]    The patient Hilario Burkett exhibits the ability to control behavior in a less restrictive environment.  Patient's level of functioning is improving.  No assaultive/destructive behavior has been observed for the past 24 hours.  No suicidal/homicidal threat or behavior has been observed for the past 24 hours.  There is no evidence of serious medication side effects.  Patient has not been in physical or protective restraints for at least the past 24 hours.    If weapons involved, how are they secured? No weapon involved    Is patient aware of and in agreement with discharge plan? He is aware of discharge and is in agreement     Arrangements for medication:  Prescriptions filled for a 30 day supply     Copy of discharge instructions to provider?:  no , patient refused to allow appointments to be made     Arrangements for transportation home:  parents to pickup     Keep all follow up appointments as scheduled, continue to take prescribed medications per physician instructions.  Mental health crisis number:  911 or your local mental health crisis line number at 819-4100       Mental Health Emergency WARM LINE      [phone]-MH ([phone])      M-F: 9am to 9pm      Sat & Sun: 5pm - 9pm  National suicide prevention lines:                             5-271-RNGGSWO (1-204-097-8466)       3-653-627-TALK ([phone])    Crisis Text Line:  Text HOME to 943665        DISCHARGE SUMMARY from Nurse    PATIENT INSTRUCTIONS:      What to do at Home:  Recommended activity: activity as tolerated,     If you experience any of the following symptoms: racing or intrusive thoughts, uncontrolled anxiety, urges to engage in self harm or high risk behaviors - talk with your support system including family, friends and assigned providers    *  Please give a list of your current medications to your Primary Care Provider.    *  Please update this list whenever your

## 2025-04-22 PROCEDURE — 1240000000 HC EMOTIONAL WELLNESS R&B

## 2025-04-22 PROCEDURE — 6370000000 HC RX 637 (ALT 250 FOR IP): Performed by: PSYCHIATRY & NEUROLOGY

## 2025-04-22 RX ADMIN — Medication 1 MG: at 08:11

## 2025-04-22 RX ADMIN — PHENOBARBITAL 64.8 MG: 64.8 TABLET ORAL at 08:11

## 2025-04-22 RX ADMIN — PHENOBARBITAL 32.4 MG: 32.4 TABLET ORAL at 17:41

## 2025-04-22 RX ADMIN — PHENOBARBITAL 32.4 MG: 32.4 TABLET ORAL at 22:10

## 2025-04-22 RX ADMIN — TRAZODONE HYDROCHLORIDE 50 MG: 50 TABLET ORAL at 22:09

## 2025-04-22 RX ADMIN — Medication 100 MG: at 08:11

## 2025-04-22 RX ADMIN — MULTIVITAMIN TABLET 1 TABLET: TABLET at 08:11

## 2025-04-22 NOTE — PLAN OF CARE
Problem: Drug Abuse/Detox  Goal: Will have no detox symptoms and will verbalize plan for changing drug-related behavior  Description: INTERVENTIONS:1. Administer medication as ordered2. Monitor physical status3. Provide emotional support with 1:1 interaction with staff4. Encourage  recovery focused treatment   Outcome: Progressing  Note: Out on unit engaged social with peers. Declined to attend group this am. States his motivation is high to pursue sobriety. Insight into he does not know what resources are available. Open to what tx team recommends. Complaint with medications. CIWA unremarkable. Daily goal is to stay active on unit and discuss d/c plans. Staff focus is on offering support

## 2025-04-22 NOTE — PLAN OF CARE
Problem: ABCDS Injury Assessment  Goal: Absence of physical injury  Flowsheets (Taken 4/22/2025 0182)  Absence of Physical Injury: Implement safety measures based on patient assessment     Pt appears to be resting in bed in no apparent distress, respirations even and unlabored. No voiced concerns. Continued CIWAs for s/s of withdrawal. Standard fall precautions maintained. Q15m rounds for safety contined per provider order.

## 2025-04-23 PROCEDURE — 6370000000 HC RX 637 (ALT 250 FOR IP): Performed by: PSYCHIATRY & NEUROLOGY

## 2025-04-23 PROCEDURE — 1240000000 HC EMOTIONAL WELLNESS R&B

## 2025-04-23 RX ORDER — ESCITALOPRAM OXALATE 10 MG/1
10 TABLET ORAL DAILY
Status: DISCONTINUED | OUTPATIENT
Start: 2025-04-23 | End: 2025-04-24 | Stop reason: HOSPADM

## 2025-04-23 RX ADMIN — Medication 1 MG: at 08:27

## 2025-04-23 RX ADMIN — TRAZODONE HYDROCHLORIDE 50 MG: 50 TABLET ORAL at 22:03

## 2025-04-23 RX ADMIN — PHENOBARBITAL 32.4 MG: 32.4 TABLET ORAL at 08:26

## 2025-04-23 RX ADMIN — Medication 100 MG: at 08:26

## 2025-04-23 RX ADMIN — MULTIVITAMIN TABLET 1 TABLET: TABLET at 08:26

## 2025-04-23 RX ADMIN — ESCITALOPRAM OXALATE 10 MG: 10 TABLET ORAL at 10:20

## 2025-04-23 RX ADMIN — PHENOBARBITAL 32.4 MG: 32.4 TABLET ORAL at 22:04

## 2025-04-23 NOTE — MANAGEMENT PLAN
[facility]                                                                                                 PATIENT INFORMATION   Patient Name: Hilario Burkett               [facility] Acct: [id number] Patient MRN: [id number]   Address:  [facility].  [address]  [place] [address] Patient CSN: [id number]      Anabaptism: None   Sex: Male Marital Status: Single   : 1990 Age:   35 yrs   Home Phone: [phone] 1.00 Mobile Phone:   [phone]     2.00   Race: Declined Employer:      Email:   Admitted/Arrived From:      Language: English       ADMISSION INFORMATION   Admit Date: 2025 Admit Time:    Patient Class: Behavior ip Service: Behavioral   Admit Source: Non-health care facility* Admit Type: Emergency   Admitting Provider: Bety Foote Attending Provider: Bety Foote*   Unit: [facility] Room/Bed: 748/01    Admission Diagnosis: Dehydration [E86.0] Dehydration, Suicidal ideation, Depression, Alcohol dependence with uncomplicated intoxication (HCC) and DX codes: E86.0, R45.851, F32.A, F10.220   Emergency Complaint: mental health                Discharge Date:   Discharge Time:     GUARANTOR INFORMATION   Name:  Hilario Burkett Address:  [facility]. [address] Rel:  Self   Phone: [phone]   [place] [id number] : 1990   EMERGENCY CONTACTS   Name: Debra Burkett Home:  Mobile: [phone] Rel: Parent   COVERAGE INFORMATION   Primary Insurance:   [employer] Subscriber: Hilario Burkett   Plan Name: [employer] [place] Pt Rel to Subscriber: Self [01]   [facility] [id number] [place] [id number] Sex: Male      Policy #:  [id number]    Group #: [id number] Group Name:   [employer]   Auth #: Auth number: [id number] Ins Phone: [phone]       Secondary Insurance:   Subscriber:     Plan Name:   Pt Rel to Subscriber:     Claim Address:  Sex:       Policy #: N/A    Group #: N/A Group Name: N/A   Auth #: N/A Ins Phone:

## 2025-04-23 NOTE — PLAN OF CARE
Problem: ABCDS Injury Assessment  Goal: Absence of physical injury  Outcome: Progressing     Pt received resting in bed with eyes closed. Respirations are even and unlabored. Pt has no visible signs of distress or discomfort. Walkways are free and clear from clutter.

## 2025-04-23 NOTE — PLAN OF CARE
Problem: Drug Abuse/Detox  Goal: Will have no detox symptoms and will verbalize plan for changing drug-related behavior  Description: INTERVENTIONS:1. Administer medication as ordered2. Monitor physical status3. Provide emotional support with 1:1 interaction with staff4. Encourage  recovery focused treatment   Outcome: Progressing  Flowsheets (Taken 4/23/2025 1032)  Will have no detox symptoms and will verbalize plan for changing drug-related behavior:   Administer medication as ordered   Encourage recovery focused treatment  Note: Patient is participating in treatment plan, patient is goal oriented, interested in attending a rehab, talking to his father regarding this, patient is med and meal compliant, social and appropriate on the unit.  Medications and discharge discussed.

## 2025-04-23 NOTE — PLAN OF CARE
Problem: Drug Abuse/Detox  Goal: Will have no detox symptoms and will verbalize plan for changing drug-related behavior  Description: INTERVENTIONS:1. Administer medication as ordered2. Monitor physical status3. Provide emotional support with 1:1 interaction with staff4. Encourage  recovery focused treatment   4/23/2025 1622 by Neeraj White RN  Outcome: Progressing     Problem: Nancy  Goal: Will exhibit normal sleep and speech and no impulsivity  Description: INTERVENTIONS:1. Administer medication as ordered2. Set limits on impulsive behavior3. Make attempts to decrease external stimuli as possible  Outcome: Progressing     Pt received out on the unit socializing with peers. Denies SI/HI/AVH. Med and meal compliant, cooperative with staff. Q15 minute safety rounds continue.

## 2025-04-24 VITALS
RESPIRATION RATE: 16 BRPM | WEIGHT: 172.3 LBS | HEIGHT: 72 IN | DIASTOLIC BLOOD PRESSURE: 92 MMHG | TEMPERATURE: 98.4 F | BODY MASS INDEX: 23.34 KG/M2 | OXYGEN SATURATION: 98 % | SYSTOLIC BLOOD PRESSURE: 128 MMHG | HEART RATE: 69 BPM

## 2025-04-24 PROCEDURE — 6370000000 HC RX 637 (ALT 250 FOR IP): Performed by: PSYCHIATRY & NEUROLOGY

## 2025-04-24 RX ORDER — TRAZODONE HYDROCHLORIDE 50 MG/1
50 TABLET ORAL NIGHTLY PRN
Qty: 30 TABLET | Refills: 0 | Status: SHIPPED | OUTPATIENT
Start: 2025-04-24 | End: 2025-05-24

## 2025-04-24 RX ORDER — CLONIDINE HYDROCHLORIDE 0.1 MG/1
0.1 TABLET ORAL EVERY 8 HOURS PRN
Qty: 42 TABLET | Refills: 0 | Status: SHIPPED | OUTPATIENT
Start: 2025-04-24 | End: 2025-05-08

## 2025-04-24 RX ORDER — ESCITALOPRAM OXALATE 10 MG/1
10 TABLET ORAL DAILY
Qty: 30 TABLET | Refills: 0 | Status: SHIPPED | OUTPATIENT
Start: 2025-04-25 | End: 2025-05-25

## 2025-04-24 RX ADMIN — MULTIVITAMIN TABLET 1 TABLET: TABLET at 08:34

## 2025-04-24 RX ADMIN — Medication 1 MG: at 08:35

## 2025-04-24 RX ADMIN — ESCITALOPRAM OXALATE 10 MG: 10 TABLET ORAL at 08:35

## 2025-04-24 RX ADMIN — PHENOBARBITAL 16.2 MG: 32.4 TABLET ORAL at 13:56

## 2025-04-24 RX ADMIN — PHENOBARBITAL 32.4 MG: 32.4 TABLET ORAL at 08:35

## 2025-04-24 RX ADMIN — Medication 100 MG: at 08:35
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
## 2025-04-24 NOTE — PLAN OF CARE
Patient resting in bed with eyes closed. No needs voiced to staff at this time, no respiratory distress noted. Patient in NAD, and monitored with 15 minute safety rounds.    Problem: Drug Abuse/Detox  Goal: Will have no detox symptoms and will verbalize plan for changing drug-related behavior  Description: INTERVENTIONS:1. Administer medication as ordered2. Monitor physical status3. Provide emotional support with 1:1 interaction with staff4. Encourage  recovery focused treatment   4/24/2025 0002 by Emma Enriquez RN  Outcome: Progressing     Problem: ABCDS Injury Assessment  Goal: Absence of physical injury  Outcome: Progressing

## 2025-04-24 NOTE — PROGRESS NOTES
Pharmacist Discharge Medication Reconciliation    Discharging Provider: Dr. Foote    Significant PMH:   Past Medical History:   Diagnosis Date    Psychiatric disorder     Anxeity     Chief Complaint for this Admission:   Chief Complaint   Patient presents with    Mental Health Problem     Allergies: Penicillins    Discharge Medications:      Medication List        START taking these medications      cloNIDine 0.1 MG tablet  Commonly known as: CATAPRES  Take 1 tablet by mouth every 8 hours as needed for High Blood Pressure or Other (anxiety)     escitalopram 10 MG tablet  Commonly known as: LEXAPRO  Take 1 tablet by mouth daily  Start taking on: April 25, 2025     traZODone 50 MG tablet  Commonly known as: DESYREL  Take 1 tablet by mouth nightly as needed for Sleep            STOP taking these medications      chlordiazePOXIDE 25 MG capsule  Commonly known as: LIBRIUM     LORazepam 1 MG tablet  Commonly known as: ATIVAN               Where to Get Your Medications        These medications were sent to [facility] [address] - P [phone] - F [phone]  [address] [place] [id number]      Phone: [phone]   cloNIDine 0.1 MG tablet  escitalopram 10 MG tablet  traZODone 50 MG tablet         The patient's chart, MAR and AVS were reviewed by Katherin Langston RPH.

## 2025-04-24 NOTE — PLAN OF CARE
Problem: Nancy  Goal: Will exhibit normal sleep and speech and no impulsivity  Description: INTERVENTIONS:1. Administer medication as ordered2. Set limits on impulsive behavior3. Make attempts to decrease external stimuli as possible  Outcome: Progressing  Note: Out on unit engaged, social and active. Demonstrating appropriate behaviors on unit with the ability to follow unit routine, sharing of common spaces and interacting w peers. Mood stable hopeful and motivated to work on discharge plans. Deflect and minimize his substance abuse addiction . Daily goal is to discuss d/c plans and clarify d/c date. Staff focus is on offering support

## 2025-04-24 NOTE — INTERDISCIPLINARY ROUNDS
Behavioral Health Interdisciplinary Rounds     Patient Name: Hilario Burkett  Age: 35 y.o.  Room/Bed:  748/  Primary Diagnosis: Depression  Admission Status: Involuntary Commitment  Readmission within 30 days: No  Power of  in place: No  Patient requires a blocked bed: No          Reason for blocked bed: N/A  Sleep hours: 7+  Participation in Care/Groups: Yes  Medication Compliant?: Yes  PRNS (last 24 hours): None  Restraints (last 24 hours): No  ____________________________  24 hour chart check complete: Yes    _______________________________________________    Patient goal(s) for today: meet with treatment team   Treatment team focus/goals: Plan to titrate his medications   Progress note: He has been sleeping well , talk with his family and his job, open to going to a 30 day program after discharge,    Psych stable for discharge     Referral sent to [facility]     Spiritual Care Consult:   Financial concerns/prescription coverage: [employer]   Family contact: SW spoke to his sister   Family requesting physician contact today:   Discharge plan: he will return home   Access to weapons: no  Outpatient provider(s): look into a SA program for after discharge     LOS:  3  Expected LOS: TBD     Participating treatment team members: Hilario Burkett, ROSCOE Yan- Dr. Qamar Langston, PharmD. - Elisa Montez RN   
Behavioral Health Interdisciplinary Rounds     Patient Name: Hilario Burkett  Age: 35 y.o.  Room/Bed:  [facility]  Primary Diagnosis: Depression   Admission Status: TDO     Readmission within 30 days:   Power of  in place:   Patient requires a blocked bed: No          Reason for blocked bed:   Sleep hours: 7+  Flu vaccine : not received this season      Participation in Care/Groups:  Yes  Medication Compliant?:  Medication Compliant: Yes  PRNS (last 24 hours):  PRNS: None    Restraints (last 24 hours):  NO  __________________________________________________  OQ Admission Analysis Survey completed:  OQ Admission Analysis Survey score:    __________________________________________________     Alcohol screening (AUDIT) completed -     Score:   Tobacco :  Illegal Drugs use:   CSSR Lifetime:     24 hour chart check complete: Yes    _______________________________________________    Patient goal(s) for today:   Treatment team focus/goals:   Progress note:      Spiritual Care Consult:   Financial concerns/prescription coverage:    Family contact:                        Family requesting physician contact today:    Discharge plan:   Access to weapons :                                                              Outpatient provider(s):     LOS:  1  Expected LOS:     Participating treatment team members: Hilario Burkett, * (assigned SW),     
Behavioral Health Interdisciplinary Rounds     Patient Name: Hilario Burkett  Age: 35 y.o.  Room/Bed:  [facility]  Primary Diagnosis: Depression   Admission Status:      Readmission within 30 days: No  Power of  in place:   Patient requires a blocked bed: no          Reason for blocked bed: n/a  Sleep hours: 7+ hours  Flu vaccine : No       Participation in Care/Groups:  n/a  Medication Compliant?: Yes  PRNS (last 24 hours): None    Restraints (last 24 hours):  no  __________________________________________________  OQ Admission Analysis Survey completed:  OQ Admission Analysis Survey score:    __________________________________________________     Alcohol screening (AUDIT) completed -     Score:   Tobacco :  Illegal Drugs use:   CSSR Lifetime:     24 hour chart check complete: Yes     _______________________________________________    Patient goal(s) for today:   Treatment team focus/goals:   Progress note:      Spiritual Care Consult:   Financial concerns/prescription coverage:    Family contact:                        Family requesting physician contact today:    Discharge plan:   Access to weapons :                                                              Outpatient provider(s):     LOS:  3  Expected LOS:     Participating treatment team members: Hilario Burkett, * (assigned SW),    
Behavioral Health Interdisciplinary Rounds     Patient Name: Hilario Burkett  Age: 35 y.o.  Room/Bed:  [facility]/  Primary Diagnosis: Depression   Admission Status: Involuntary Commitment     Readmission within 30 days:   Power of  in place:   Patient requires a blocked bed: No          Reason for blocked bed:   Sleep hours:   Flu vaccine : not received this season      Participation in Care/Groups:  Yes  Medication Compliant?:  Medication Compliant: Yes  PRNS (last 24 hours):  PRNS: None    Restraints (last 24 hours):  NO  ____________________________  24 hour chart check complete: Yes    _______________________________________________    Patient goal(s) for today: meet with treatment team  Treatment team focus/goals: Plan for discharge home   Progress note: He denies SI , denies any issues with his medications,      Spiritual Care Consult: no  Financial concerns/prescription coverage:  [employer]   Family contact:  sister                       Family requesting physician contact today:    Discharge plan: he will return home   Access to weapons : no                                                             Outpatient provider(s): we are recommending IOP -      LOS:  5  Expected LOS: today     Participating treatment team members: Jennifer Jensen SW- Dr. Zanoun - Jessica Lurey, RN     
Behavioral Health Interdisciplinary Rounds     Patient Name: Hilario Burkett  Age: 35 y.o.  Room/Bed:  [facility]/  Primary Diagnosis: Depression   Admission Status: involuntarily committed      Readmission within 30 days: No  Power of  in place: No  Patient requires a blocked bed: no          Reason for blocked bed: n/a  Sleep hours: 7+ hours  Flu vaccine : None       Participation in Care/Groups:  n/a  Medication Compliant?: Yes  PRNS (last 24 hours): PRN clonidine    Restraints (last 24 hours):  no  __________________________________________________    24 hour chart check complete: Yes     _______________________________________________    Patient goal(s) for today: meet with treatment team   Treatment team focus/goals: Plan to set up discharge   Progress note:   Spiritual Care Consult:   Financial concerns/prescription coverage: [employer]    Family contact:  he signed a ELIGIO for his sister -   [facility] - [phone]                    Family requesting physician contact today:    Discharge plan: he lives in his home   Access to weapons : no                                                             Outpatient provider(s): IOP     LOS:  2  Expected LOS: TBD    Participating treatment team members: Hilario Burkett, ROSCOE Yan- Dr. Qamar Langston, PharmD. - Natali VelasquezRN   
Continue Regimen: clindamycin phosphate 1 % topical solution : Apply to the affected areas two times daily.\\n\\nAdainzoxia 0.3 %-2.5 %-4 % topical gel : Apply to the affected area on the face at night, three times a week, add more nights as tolerated
Detail Level: Zone
Render In Strict Bullet Format?: No
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
